# Patient Record
Sex: FEMALE | Race: WHITE | NOT HISPANIC OR LATINO | Employment: OTHER | URBAN - METROPOLITAN AREA
[De-identification: names, ages, dates, MRNs, and addresses within clinical notes are randomized per-mention and may not be internally consistent; named-entity substitution may affect disease eponyms.]

---

## 2023-05-16 ENCOUNTER — HOSPITAL ENCOUNTER (OUTPATIENT)
Dept: DATA CONVERSION | Facility: HOSPITAL | Age: 68
End: 2023-05-16
Attending: INTERNAL MEDICINE | Admitting: INTERNAL MEDICINE
Payer: MEDICARE

## 2023-05-16 DIAGNOSIS — Z86.010 PERSONAL HISTORY OF COLONIC POLYPS: ICD-10-CM

## 2023-05-16 DIAGNOSIS — D12.4 BENIGN NEOPLASM OF DESCENDING COLON: ICD-10-CM

## 2023-05-16 DIAGNOSIS — Z80.0 FAMILY HISTORY OF MALIGNANT NEOPLASM OF DIGESTIVE ORGANS: ICD-10-CM

## 2023-05-16 DIAGNOSIS — K57.30 DIVERTICULOSIS OF LARGE INTESTINE WITHOUT PERFORATION OR ABSCESS WITHOUT BLEEDING: ICD-10-CM

## 2023-05-16 DIAGNOSIS — F41.9 ANXIETY DISORDER, UNSPECIFIED: ICD-10-CM

## 2023-05-16 DIAGNOSIS — D12.2 BENIGN NEOPLASM OF ASCENDING COLON: ICD-10-CM

## 2023-05-16 DIAGNOSIS — K21.9 GASTRO-ESOPHAGEAL REFLUX DISEASE WITHOUT ESOPHAGITIS: ICD-10-CM

## 2023-05-16 DIAGNOSIS — Z12.11 ENCOUNTER FOR SCREENING FOR MALIGNANT NEOPLASM OF COLON: ICD-10-CM

## 2023-05-16 DIAGNOSIS — K64.0 FIRST DEGREE HEMORRHOIDS: ICD-10-CM

## 2023-05-16 DIAGNOSIS — D12.3 BENIGN NEOPLASM OF TRANSVERSE COLON: ICD-10-CM

## 2023-05-16 DIAGNOSIS — Z87.891 PERSONAL HISTORY OF NICOTINE DEPENDENCE: ICD-10-CM

## 2023-05-22 LAB
COMPLETE PATHOLOGY REPORT: NORMAL
CONVERTED CLINICAL DIAGNOSIS-HISTORY: NORMAL
CONVERTED FINAL DIAGNOSIS: NORMAL
CONVERTED FINAL REPORT PDF LINK TO COPY AND PASTE: NORMAL
CONVERTED GROSS DESCRIPTION: NORMAL

## 2023-07-20 LAB
ALANINE AMINOTRANSFERASE (SGPT) (U/L) IN SER/PLAS: 20 U/L (ref 7–45)
ALBUMIN (G/DL) IN SER/PLAS: 4.1 G/DL (ref 3.4–5)
ALKALINE PHOSPHATASE (U/L) IN SER/PLAS: 50 U/L (ref 33–136)
ANION GAP IN SER/PLAS: 12 MMOL/L (ref 10–20)
ASPARTATE AMINOTRANSFERASE (SGOT) (U/L) IN SER/PLAS: 26 U/L (ref 9–39)
BILIRUBIN TOTAL (MG/DL) IN SER/PLAS: 0.8 MG/DL (ref 0–1.2)
CALCIDIOL (25 OH VITAMIN D3) (NG/ML) IN SER/PLAS: 62 NG/ML
CALCIUM (MG/DL) IN SER/PLAS: 9.3 MG/DL (ref 8.6–10.3)
CARBON DIOXIDE, TOTAL (MMOL/L) IN SER/PLAS: 27 MMOL/L (ref 21–32)
CHLORIDE (MMOL/L) IN SER/PLAS: 98 MMOL/L (ref 98–107)
CREATININE (MG/DL) IN SER/PLAS: 0.75 MG/DL (ref 0.5–1.05)
ERYTHROCYTE DISTRIBUTION WIDTH (RATIO) BY AUTOMATED COUNT: 12.4 % (ref 11.5–14.5)
ERYTHROCYTE MEAN CORPUSCULAR HEMOGLOBIN CONCENTRATION (G/DL) BY AUTOMATED: 34.4 G/DL (ref 32–36)
ERYTHROCYTE MEAN CORPUSCULAR VOLUME (FL) BY AUTOMATED COUNT: 93 FL (ref 80–100)
ERYTHROCYTES (10*6/UL) IN BLOOD BY AUTOMATED COUNT: 4.38 X10E12/L (ref 4–5.2)
GFR FEMALE: 88 ML/MIN/1.73M2
GLUCOSE (MG/DL) IN SER/PLAS: 86 MG/DL (ref 74–99)
HEMATOCRIT (%) IN BLOOD BY AUTOMATED COUNT: 40.7 % (ref 36–46)
HEMOGLOBIN (G/DL) IN BLOOD: 14 G/DL (ref 12–16)
LEUKOCYTES (10*3/UL) IN BLOOD BY AUTOMATED COUNT: 4.1 X10E9/L (ref 4.4–11.3)
PLATELETS (10*3/UL) IN BLOOD AUTOMATED COUNT: 219 X10E9/L (ref 150–450)
POTASSIUM (MMOL/L) IN SER/PLAS: 3.7 MMOL/L (ref 3.5–5.3)
PROTEIN TOTAL: 6.8 G/DL (ref 6.4–8.2)
SODIUM (MMOL/L) IN SER/PLAS: 133 MMOL/L (ref 136–145)
THYROTROPIN (MIU/L) IN SER/PLAS BY DETECTION LIMIT <= 0.05 MIU/L: 2.73 MIU/L (ref 0.44–3.98)
UREA NITROGEN (MG/DL) IN SER/PLAS: 7 MG/DL (ref 6–23)

## 2023-09-07 VITALS — HEIGHT: 61 IN | WEIGHT: 137.79 LBS | BODY MASS INDEX: 26.01 KG/M2

## 2023-09-28 ENCOUNTER — TELEPHONE (OUTPATIENT)
Dept: PRIMARY CARE | Facility: CLINIC | Age: 68
End: 2023-09-28
Payer: MEDICARE

## 2023-09-28 NOTE — TELEPHONE ENCOUNTER
Patient last saw you a little over 2 years ago. She moved away so was with a different doctor Did make an appt to get back in with you. She is on alprazolam and wanted to know if you are willing to prescribe? She does not need it now, can wait until her appt

## 2023-12-18 ENCOUNTER — OFFICE VISIT (OUTPATIENT)
Dept: PRIMARY CARE | Facility: CLINIC | Age: 68
End: 2023-12-18
Payer: MEDICARE

## 2023-12-18 VITALS
BODY MASS INDEX: 25.58 KG/M2 | HEART RATE: 72 BPM | WEIGHT: 139 LBS | SYSTOLIC BLOOD PRESSURE: 112 MMHG | DIASTOLIC BLOOD PRESSURE: 68 MMHG | RESPIRATION RATE: 14 BRPM | HEIGHT: 62 IN

## 2023-12-18 DIAGNOSIS — J30.89 ALLERGIC RHINITIS DUE TO HOUSE DUST MITE: ICD-10-CM

## 2023-12-18 DIAGNOSIS — E66.3 OVERWEIGHT WITH BODY MASS INDEX (BMI) OF 25 TO 25.9 IN ADULT: ICD-10-CM

## 2023-12-18 DIAGNOSIS — K58.0 IRRITABLE BOWEL SYNDROME WITH DIARRHEA: ICD-10-CM

## 2023-12-18 DIAGNOSIS — F51.04 PSYCHOPHYSIOLOGICAL INSOMNIA: ICD-10-CM

## 2023-12-18 DIAGNOSIS — K21.00 GASTROESOPHAGEAL REFLUX DISEASE WITH ESOPHAGITIS WITHOUT HEMORRHAGE: ICD-10-CM

## 2023-12-18 DIAGNOSIS — L50.9 HIVES: ICD-10-CM

## 2023-12-18 DIAGNOSIS — D72.819 LEUKOPENIA, UNSPECIFIED TYPE: ICD-10-CM

## 2023-12-18 DIAGNOSIS — Z53.20 OSTEOPOROSIS MONITORING DECLINED: ICD-10-CM

## 2023-12-18 DIAGNOSIS — Z00.00 MEDICARE ANNUAL WELLNESS VISIT, INITIAL: Primary | ICD-10-CM

## 2023-12-18 DIAGNOSIS — J30.1 SEASONAL ALLERGIC RHINITIS DUE TO POLLEN: ICD-10-CM

## 2023-12-18 DIAGNOSIS — Z51.81 THERAPEUTIC DRUG MONITORING: ICD-10-CM

## 2023-12-18 PROBLEM — M25.561 RIGHT KNEE PAIN: Status: ACTIVE | Noted: 2023-12-18

## 2023-12-18 PROBLEM — R76.8 ELEVATED IGE LEVEL: Status: ACTIVE | Noted: 2023-12-18

## 2023-12-18 PROBLEM — E74.10 FRUCTOSE MALABSORPTION: Status: ACTIVE | Noted: 2023-12-18

## 2023-12-18 PROBLEM — G47.00 INSOMNIA: Status: ACTIVE | Noted: 2023-12-18

## 2023-12-18 PROBLEM — K63.5 COLON POLYP: Status: RESOLVED | Noted: 2023-12-18 | Resolved: 2023-12-18

## 2023-12-18 PROBLEM — J30.2 SEASONAL ALLERGIES: Status: ACTIVE | Noted: 2023-12-18

## 2023-12-18 PROBLEM — M22.41 CHONDROMALACIA OF RIGHT PATELLA: Status: ACTIVE | Noted: 2023-12-18

## 2023-12-18 PROBLEM — L23.1 ALLERGIC CONTACT DERMATITIS DUE TO ADHESIVES: Status: ACTIVE | Noted: 2021-07-29

## 2023-12-18 PROBLEM — K22.719 BARRETT'S ESOPHAGUS WITH DYSPLASIA: Status: ACTIVE | Noted: 2021-07-29

## 2023-12-18 PROBLEM — J31.0 CHRONIC RHINITIS: Status: ACTIVE | Noted: 2021-07-29

## 2023-12-18 PROBLEM — K63.5 COLON POLYP: Status: ACTIVE | Noted: 2023-12-18

## 2023-12-18 PROBLEM — Z23 ENCOUNTER FOR IMMUNIZATION: Status: ACTIVE | Noted: 2023-12-18

## 2023-12-18 PROBLEM — K90.49 FOOD INTOLERANCE IN ADULT: Status: ACTIVE | Noted: 2021-07-29

## 2023-12-18 PROBLEM — M25.561 RIGHT KNEE PAIN: Status: RESOLVED | Noted: 2023-12-18 | Resolved: 2023-12-18

## 2023-12-18 PROBLEM — K57.90 DIVERTICULOSIS: Status: ACTIVE | Noted: 2023-12-18

## 2023-12-18 PROBLEM — Z91.018 MULTIPLE FOOD ALLERGIES: Status: ACTIVE | Noted: 2021-07-29

## 2023-12-18 PROBLEM — H26.9 CATARACT: Status: ACTIVE | Noted: 2023-12-18

## 2023-12-18 PROBLEM — K59.00 CONSTIPATION: Status: RESOLVED | Noted: 2023-12-18 | Resolved: 2023-12-18

## 2023-12-18 PROBLEM — J31.0 CHRONIC RHINITIS: Status: RESOLVED | Noted: 2021-07-29 | Resolved: 2023-12-18

## 2023-12-18 PROBLEM — M81.0 OSTEOPOROSIS: Status: ACTIVE | Noted: 2023-12-18

## 2023-12-18 PROBLEM — K59.00 CONSTIPATION: Status: ACTIVE | Noted: 2023-12-18

## 2023-12-18 PROBLEM — M22.41 CHONDROMALACIA OF RIGHT PATELLA: Status: RESOLVED | Noted: 2023-12-18 | Resolved: 2023-12-18

## 2023-12-18 PROCEDURE — 1160F RVW MEDS BY RX/DR IN RCRD: CPT | Performed by: FAMILY MEDICINE

## 2023-12-18 PROCEDURE — 81003 URINALYSIS AUTO W/O SCOPE: CPT

## 2023-12-18 PROCEDURE — 80365 DRUG SCREENING OXYCODONE: CPT

## 2023-12-18 PROCEDURE — G0438 PPPS, INITIAL VISIT: HCPCS | Performed by: FAMILY MEDICINE

## 2023-12-18 PROCEDURE — 1036F TOBACCO NON-USER: CPT | Performed by: FAMILY MEDICINE

## 2023-12-18 PROCEDURE — 1170F FXNL STATUS ASSESSED: CPT | Performed by: FAMILY MEDICINE

## 2023-12-18 PROCEDURE — 80307 DRUG TEST PRSMV CHEM ANLYZR: CPT

## 2023-12-18 PROCEDURE — 80354 DRUG SCREENING FENTANYL: CPT

## 2023-12-18 PROCEDURE — 80373 DRUG SCREENING TRAMADOL: CPT

## 2023-12-18 PROCEDURE — 80361 OPIATES 1 OR MORE: CPT

## 2023-12-18 PROCEDURE — 99214 OFFICE O/P EST MOD 30 MIN: CPT | Performed by: FAMILY MEDICINE

## 2023-12-18 PROCEDURE — 1159F MED LIST DOCD IN RCRD: CPT | Performed by: FAMILY MEDICINE

## 2023-12-18 PROCEDURE — 82570 ASSAY OF URINE CREATININE: CPT

## 2023-12-18 PROCEDURE — 80358 DRUG SCREENING METHADONE: CPT

## 2023-12-18 PROCEDURE — 80346 BENZODIAZEPINES1-12: CPT

## 2023-12-18 PROCEDURE — 3008F BODY MASS INDEX DOCD: CPT | Performed by: FAMILY MEDICINE

## 2023-12-18 PROCEDURE — 80368 SEDATIVE HYPNOTICS: CPT

## 2023-12-18 RX ORDER — MINERAL OIL
180 ENEMA (ML) RECTAL
COMMUNITY
Start: 2021-07-29 | End: 2023-12-18 | Stop reason: SDUPTHER

## 2023-12-18 RX ORDER — LORAZEPAM 2 MG/1
2 TABLET ORAL NIGHTLY
COMMUNITY
Start: 2023-11-20 | End: 2023-12-18 | Stop reason: SDUPTHER

## 2023-12-18 RX ORDER — LANSOPRAZOLE 30 MG/1
CAPSULE, DELAYED RELEASE ORAL
COMMUNITY
Start: 2023-09-21 | End: 2023-12-18 | Stop reason: SDUPTHER

## 2023-12-18 RX ORDER — LANSOPRAZOLE 30 MG/1
30 CAPSULE, DELAYED RELEASE ORAL
Start: 2023-12-18 | End: 2024-02-12 | Stop reason: SDUPTHER

## 2023-12-18 RX ORDER — LORAZEPAM 2 MG/1
2 TABLET ORAL NIGHTLY
Qty: 30 TABLET | Refills: 0 | Status: SHIPPED | OUTPATIENT
Start: 2023-12-18 | End: 2024-01-22 | Stop reason: SDUPTHER

## 2023-12-18 RX ORDER — MINERAL OIL
180 ENEMA (ML) RECTAL
Start: 2023-12-18

## 2023-12-18 RX ORDER — MULTIVITAMIN
1 TABLET ORAL DAILY
COMMUNITY

## 2023-12-18 ASSESSMENT — PATIENT HEALTH QUESTIONNAIRE - PHQ9
1. LITTLE INTEREST OR PLEASURE IN DOING THINGS: SEVERAL DAYS
2. FEELING DOWN, DEPRESSED OR HOPELESS: SEVERAL DAYS
SUM OF ALL RESPONSES TO PHQ9 QUESTIONS 1 AND 2: 2

## 2023-12-18 ASSESSMENT — ENCOUNTER SYMPTOMS
NUMBNESS: 0
CONSTIPATION: 0
DYSPHORIC MOOD: 0
WHEEZING: 0
DIAPHORESIS: 0
CONFUSION: 0
COUGH: 0
POLYPHAGIA: 0
CHEST TIGHTNESS: 0
SINUS PAIN: 0
FEVER: 0
FREQUENCY: 0
SINUS PRESSURE: 0
SORE THROAT: 0
UNEXPECTED WEIGHT CHANGE: 0
VOMITING: 0
ADENOPATHY: 0
CHILLS: 0
ABDOMINAL PAIN: 0
DYSURIA: 0
NERVOUS/ANXIOUS: 0
NAUSEA: 0
DIZZINESS: 0
DIARRHEA: 0
POLYDIPSIA: 0
PALPITATIONS: 0
HEMATURIA: 0
SHORTNESS OF BREATH: 0
LIGHT-HEADEDNESS: 0
HEADACHES: 0

## 2023-12-18 ASSESSMENT — ANXIETY QUESTIONNAIRES
IF YOU CHECKED OFF ANY PROBLEMS ON THIS QUESTIONNAIRE, HOW DIFFICULT HAVE THESE PROBLEMS MADE IT FOR YOU TO DO YOUR WORK, TAKE CARE OF THINGS AT HOME, OR GET ALONG WITH OTHER PEOPLE: NOT DIFFICULT AT ALL
3. WORRYING TOO MUCH ABOUT DIFFERENT THINGS: SEVERAL DAYS
5. BEING SO RESTLESS THAT IT IS HARD TO SIT STILL: NOT AT ALL
1. FEELING NERVOUS, ANXIOUS, OR ON EDGE: NOT AT ALL
6. BECOMING EASILY ANNOYED OR IRRITABLE: NOT AT ALL
4. TROUBLE RELAXING: NOT AT ALL
7. FEELING AFRAID AS IF SOMETHING AWFUL MIGHT HAPPEN: NOT AT ALL
2. NOT BEING ABLE TO STOP OR CONTROL WORRYING: NOT AT ALL
GAD7 TOTAL SCORE: 1

## 2023-12-18 ASSESSMENT — ACTIVITIES OF DAILY LIVING (ADL)
MANAGING_FINANCES: INDEPENDENT
BATHING: INDEPENDENT
DRESSING: INDEPENDENT
GROCERY_SHOPPING: INDEPENDENT
DOING_HOUSEWORK: INDEPENDENT
TAKING_MEDICATION: INDEPENDENT

## 2023-12-18 NOTE — PROGRESS NOTES
Subjective   Reason for Visit: Paty Guthrie is an 68 y.o. female here for a Medicare Wellness visit.     Past Medical, Surgical, and Family History reviewed and updated in chart.    Reviewed all medications by prescribing practitioner or clinical pharmacist (such as prescriptions, OTCs, herbal therapies and supplements) and documented in the medical record.    HPI    routine follow up. chronic issues as per assessment and plan.     OARRS:  Dagmar Robin MD on 2023  2:33 PM  I have personally reviewed the OARRS report for Paty Guthrie. I have considered the risks of abuse, dependence, addiction and diversion and I believe that it is clinically appropriate for Paty Guthrie to be prescribed this medication    Is the patient prescribed a combination of a benzodiazepine and opioid?  No    Last Urine Drug Screen / ordered today: Yes  No results found for this or any previous visit (from the past 8760 hour(s)).  N/A        Controlled Substance Agreement: Benzodiazepine  Date of the Last Agreement: 23   Reviewed Controlled Substance Agreement including but not limited to the benefits, risks, and alternatives to treatment with a Controlled Substance medication(s).    Benzodiazepines:  What is the patient's goal of therapy? Initiation and maintenance of sleep  Is this being achieved with current treatment? yes    OSVALDO-7:  Over the last 2 weeks, how often have you been bothered by any of the following problems?  Feeling nervous, anxious, or on edge: 0  Not being able to stop or control worryin  Worrying too much about different things: 1  Trouble relaxin  Being so restless that it is hard to sit still: 0  Becoming easily annoyed or irritable: 0  Feeling afraid as if something awful might happen: 0  OSVALDO-7 Total Score: 1        Activities of Daily Living:   Is your overall impression that this patient is benefiting (symptom reduction outweighs side effects) from benzodiazepine therapy? Yes     1.  "Physical Functioning: Better  2. Family Relationship: Better  3. Social Relationship: Better  4. Mood: Better  5. Sleep Patterns: Better  6. Overall Function: Better    Patient Care Team:  Dagmar Robin MD as PCP - General (Family Medicine)     Review of Systems   Constitutional:  Negative for chills, diaphoresis, fever and unexpected weight change.   HENT:  Negative for congestion, sinus pressure, sinus pain, sneezing and sore throat.    Respiratory:  Negative for cough, chest tightness, shortness of breath and wheezing.    Cardiovascular:  Negative for chest pain, palpitations and leg swelling.   Gastrointestinal:  Negative for abdominal pain, constipation, diarrhea, nausea and vomiting.   Endocrine: Negative for cold intolerance, heat intolerance, polydipsia, polyphagia and polyuria.   Genitourinary:  Negative for dysuria, frequency, hematuria and urgency.   Neurological:  Negative for dizziness, syncope, light-headedness, numbness and headaches.   Hematological:  Negative for adenopathy.   Psychiatric/Behavioral:  Negative for confusion and dysphoric mood. The patient is not nervous/anxious.        Objective   Vitals:  /68   Pulse 72   Resp 14   Ht 1.562 m (5' 1.5\")   Wt 63 kg (139 lb)   BMI 25.84 kg/m²       Physical Exam  Vitals and nursing note reviewed.   Constitutional:       General: She is not in acute distress.     Appearance: Normal appearance.   HENT:      Head: Normocephalic and atraumatic.      Nose: Nose normal.   Eyes:      Extraocular Movements: Extraocular movements intact.      Conjunctiva/sclera: Conjunctivae normal.      Pupils: Pupils are equal, round, and reactive to light.   Cardiovascular:      Rate and Rhythm: Normal rate and regular rhythm.      Heart sounds: No murmur heard.     No friction rub. No gallop.   Pulmonary:      Effort: Pulmonary effort is normal.      Breath sounds: Normal breath sounds. No wheezing, rhonchi or rales.   Abdominal:      General: Bowel sounds are " normal. There is no distension.      Palpations: Abdomen is soft.      Tenderness: There is no abdominal tenderness.   Musculoskeletal:         General: Normal range of motion.      Cervical back: Normal range of motion and neck supple.   Skin:     General: Skin is warm and dry.   Neurological:      General: No focal deficit present.      Mental Status: She is alert and oriented to person, place, and time.      Deep Tendon Reflexes: Reflexes normal.   Psychiatric:         Mood and Affect: Mood normal.         Behavior: Behavior normal.         Thought Content: Thought content normal.         Judgment: Judgment normal.         Assessment/Plan   Problem List Items Addressed This Visit       Allergic rhinitis due to house dust mite    Current Assessment & Plan     - continue fexofenadine          Relevant Medications    fexofenadine (Allegra) 180 mg tablet    BMI 25.0-25.9,adult    Current Assessment & Plan     - Encouraged healthy lifestyle, including adequate exercise and high fiber, low fat and low carb diet.          Gastroesophageal reflux disease with esophagitis without hemorrhage    Current Assessment & Plan     - controlled. Continue lansoprazole          Relevant Medications    lansoprazole (Prevacid) 30 mg DR capsule    Hives    Current Assessment & Plan     - continue fexofenadine         Relevant Medications    fexofenadine (Allegra) 180 mg tablet    Irritable bowel syndrome with diarrhea    Current Assessment & Plan     - diet controlled.         Leukopenia    Current Assessment & Plan     - saw hematology. [place] to be benign. monitor         Osteoporosis monitoring declined    Current Assessment & Plan     - states she does want to have a DEXA scan but right now is not the time. She will let me know when she is ready to schedule          Overweight with body mass index (BMI) of 25 to 25.9 in adult    Current Assessment & Plan     - Encouraged healthy lifestyle, including adequate exercise and high fiber,  low fat and low carb diet.          Psychophysiological insomnia    Current Assessment & Plan     - stable. continue lorazepam. Has tried many other options and has not been successful         Relevant Medications    LORazepam (Ativan) 2 mg tablet    Seasonal allergic rhinitis due to pollen    Current Assessment & Plan     - controlled. Continue fexofenadine          Relevant Medications    fexofenadine (Allegra) 180 mg tablet     Other Visit Diagnoses       Medicare annual wellness visit, initial    -  Primary    Therapeutic drug monitoring        Relevant Orders    Opiate/Opioid/Benzo Prescription Compliance

## 2023-12-18 NOTE — ASSESSMENT & PLAN NOTE
- states she does want to have a DEXA scan but right now is not the time. She will let me know when she is ready to schedule

## 2023-12-18 NOTE — PATIENT INSTRUCTIONS
Paty Guthrie ,    Thank you for coming in today. We at [facility] appreciate your trust in our care. If you have any questions or concerns about the care you received today, please do not hesitate to contact us at [phone].    The following instructions were discussed today:    - I recommend the following vaccines at the pharmacy: COVID-19, Shingrix, RSV  - Follow up in 3 months.

## 2023-12-18 NOTE — ASSESSMENT & PLAN NOTE
- declines flu shot  - declines PCV 20  - recommended the following vaccines at the pharmacy: COVID-19, Shingrix, RSV

## 2023-12-19 LAB
AMPHETAMINES UR QL SCN: ABNORMAL
BARBITURATES UR QL SCN: ABNORMAL
BZE UR QL SCN: ABNORMAL
CANNABINOIDS UR QL SCN: ABNORMAL
CREAT UR-MCNC: 13.1 MG/DL (ref 20–320)
PCP UR QL SCN: ABNORMAL
SP GR UR STRIP.AUTO: 1

## 2023-12-21 LAB
1OH-MIDAZOLAM UR CFM-MCNC: <25 NG/ML
6MAM UR CFM-MCNC: <25 NG/ML
7AMINOCLONAZEPAM UR CFM-MCNC: <25 NG/ML
A-OH ALPRAZ UR CFM-MCNC: <25 NG/ML
ALPRAZ UR CFM-MCNC: <25 NG/ML
CHLORDIAZEP UR CFM-MCNC: <25 NG/ML
CLONAZEPAM UR CFM-MCNC: <25 NG/ML
CODEINE UR CFM-MCNC: <50 NG/ML
DIAZEPAM UR CFM-MCNC: <25 NG/ML
EDDP UR CFM-MCNC: <25 NG/ML
FENTANYL UR CFM-MCNC: <2.5 NG/ML
HYDROCODONE CTO UR CFM-MCNC: <25 NG/ML
HYDROMORPHONE UR CFM-MCNC: <25 NG/ML
LORAZEPAM UR CFM-MCNC: 228 NG/ML
METHADONE UR CFM-MCNC: <25 NG/ML
MIDAZOLAM UR CFM-MCNC: <25 NG/ML
MORPHINE UR CFM-MCNC: <50 NG/ML
NORDIAZEPAM UR CFM-MCNC: <25 NG/ML
NORFENTANYL UR CFM-MCNC: <2.5 NG/ML
NORHYDROCODONE UR CFM-MCNC: <25 NG/ML
NOROXYCODONE UR CFM-MCNC: <25 NG/ML
NORTRAMADOL UR-MCNC: <50 NG/ML
OXAZEPAM UR CFM-MCNC: <25 NG/ML
OXYCODONE UR CFM-MCNC: <25 NG/ML
OXYMORPHONE UR CFM-MCNC: <25 NG/ML
TEMAZEPAM UR CFM-MCNC: <25 NG/ML
TRAMADOL UR CFM-MCNC: <50 NG/ML
ZOLPIDEM UR CFM-MCNC: <25 NG/ML
ZOLPIDEM UR-MCNC: <25 NG/ML

## 2024-01-22 DIAGNOSIS — F51.04 PSYCHOPHYSIOLOGICAL INSOMNIA: ICD-10-CM

## 2024-01-22 RX ORDER — LORAZEPAM 2 MG/1
2 TABLET ORAL NIGHTLY
Qty: 30 TABLET | Refills: 0 | Status: SHIPPED | OUTPATIENT
Start: 2024-01-22 | End: 2024-02-21 | Stop reason: SDUPTHER

## 2024-01-22 NOTE — TELEPHONE ENCOUNTER
Rx Refill Request Telephone Encounter    Name:  Paty Guthrie  :  [id number]  Medication Name:  lorazapam            Specific Pharmacy location:  [facility]  Date of last appointment:  23  Date of next appointment:  24  Best number to reach patient:  [phone]

## 2024-01-22 NOTE — TELEPHONE ENCOUNTER
Last appointment: 12/18/2023  Next appointment: 3/26/2024     Eprescribed. I have personally reviewed the OARRS report.  This report is scanned into the electronic medical record.  I have considered the risks of abuse, dependence, addiction and diversion.  I believe that it is clinically appropriate to prescribe this medication.

## 2024-02-12 DIAGNOSIS — K21.00 GASTROESOPHAGEAL REFLUX DISEASE WITH ESOPHAGITIS WITHOUT HEMORRHAGE: ICD-10-CM

## 2024-02-13 RX ORDER — LANSOPRAZOLE 30 MG/1
30 CAPSULE, DELAYED RELEASE ORAL
Qty: 90 CAPSULE | Refills: 1 | Status: SHIPPED | OUTPATIENT
Start: 2024-02-13 | End: 2024-08-11

## 2024-02-15 ENCOUNTER — TELEPHONE (OUTPATIENT)
Dept: PRIMARY CARE | Facility: CLINIC | Age: 69
End: 2024-02-15
Payer: MEDICARE

## 2024-02-15 NOTE — LETTER
Paty Guthrie  1955    2/15/2024    To Whom This May Concern:    My patient, Paty Guthrie, is unable to perform Jury Duty due to a mental or physical condition that renders the prospective juror unfit for jury service for the remainder of the jury year.    Should you have any questions please do not hesitate to call.    Thank you for your cooperation.    Sincerely,    Dagmar Robin MD

## 2024-02-15 NOTE — TELEPHONE ENCOUNTER
please let patient know  I wrote a letter and sent it to her MyChart so she should be able to print it off from them. If she cannot, please print a copy for her.    yes...

## 2024-02-15 NOTE — TELEPHONE ENCOUNTER
Pt was summoned for jury duty in [facility] and she wrote letter requesting to be excused but was told she needed letter from pcp.  She said she has IBS and would not be able to do jury duty.  She wants letter faxed to [phone].    Faxed and called pt to let her know it was done and copy in her MyChart.

## 2024-02-21 DIAGNOSIS — F51.04 PSYCHOPHYSIOLOGICAL INSOMNIA: ICD-10-CM

## 2024-02-21 RX ORDER — LORAZEPAM 2 MG/1
2 TABLET ORAL NIGHTLY
Qty: 30 TABLET | Refills: 0 | Status: SHIPPED | OUTPATIENT
Start: 2024-02-21 | End: 2024-03-28 | Stop reason: SDUPTHER

## 2024-02-21 NOTE — TELEPHONE ENCOUNTER
Last appointment: 2/15/2024  Next appointment: 3/26/2024     Eprescribed. I have personally reviewed the OARRS report.  This report is scanned into the electronic medical record.  I have considered the risks of abuse, dependence, addiction and diversion.  I believe that it is clinically appropriate to prescribe this medication.

## 2024-03-25 DIAGNOSIS — F51.04 PSYCHOPHYSIOLOGICAL INSOMNIA: ICD-10-CM

## 2024-03-26 ENCOUNTER — APPOINTMENT (OUTPATIENT)
Dept: PRIMARY CARE | Facility: CLINIC | Age: 69
End: 2024-03-26
Payer: MEDICARE

## 2024-03-27 RX ORDER — LORAZEPAM 2 MG/1
2 TABLET ORAL NIGHTLY
Qty: 30 TABLET | Refills: 0 | OUTPATIENT
Start: 2024-03-27 | End: 2024-04-26

## 2024-03-28 ENCOUNTER — OFFICE VISIT (OUTPATIENT)
Dept: PRIMARY CARE | Facility: CLINIC | Age: 69
End: 2024-03-28
Payer: MEDICARE

## 2024-03-28 VITALS
HEART RATE: 63 BPM | HEIGHT: 62 IN | DIASTOLIC BLOOD PRESSURE: 84 MMHG | WEIGHT: 142 LBS | OXYGEN SATURATION: 94 % | RESPIRATION RATE: 14 BRPM | BODY MASS INDEX: 26.13 KG/M2 | SYSTOLIC BLOOD PRESSURE: 129 MMHG

## 2024-03-28 DIAGNOSIS — R68.89 OTHER GENERAL SYMPTOMS AND SIGNS: ICD-10-CM

## 2024-03-28 DIAGNOSIS — E66.3 OVERWEIGHT WITH BODY MASS INDEX (BMI) OF 26 TO 26.9 IN ADULT: ICD-10-CM

## 2024-03-28 DIAGNOSIS — K22.719 BARRETT'S ESOPHAGUS WITH DYSPLASIA: ICD-10-CM

## 2024-03-28 DIAGNOSIS — Z11.59 NEED FOR HEPATITIS C SCREENING TEST: ICD-10-CM

## 2024-03-28 DIAGNOSIS — M81.0 AGE-RELATED OSTEOPOROSIS WITHOUT CURRENT PATHOLOGICAL FRACTURE: ICD-10-CM

## 2024-03-28 DIAGNOSIS — K58.0 IRRITABLE BOWEL SYNDROME WITH DIARRHEA: ICD-10-CM

## 2024-03-28 DIAGNOSIS — L50.9 HIVES: ICD-10-CM

## 2024-03-28 DIAGNOSIS — Z13.220 LIPID SCREENING: ICD-10-CM

## 2024-03-28 DIAGNOSIS — J30.89 ALLERGIC RHINITIS DUE TO HOUSE DUST MITE: Primary | ICD-10-CM

## 2024-03-28 DIAGNOSIS — D72.819 LEUKOPENIA, UNSPECIFIED TYPE: ICD-10-CM

## 2024-03-28 DIAGNOSIS — K21.00 GASTROESOPHAGEAL REFLUX DISEASE WITH ESOPHAGITIS WITHOUT HEMORRHAGE: ICD-10-CM

## 2024-03-28 DIAGNOSIS — F51.04 PSYCHOPHYSIOLOGICAL INSOMNIA: ICD-10-CM

## 2024-03-28 PROCEDURE — 1159F MED LIST DOCD IN RCRD: CPT | Performed by: FAMILY MEDICINE

## 2024-03-28 PROCEDURE — 1160F RVW MEDS BY RX/DR IN RCRD: CPT | Performed by: FAMILY MEDICINE

## 2024-03-28 PROCEDURE — 1036F TOBACCO NON-USER: CPT | Performed by: FAMILY MEDICINE

## 2024-03-28 PROCEDURE — 99214 OFFICE O/P EST MOD 30 MIN: CPT | Performed by: FAMILY MEDICINE

## 2024-03-28 PROCEDURE — 3008F BODY MASS INDEX DOCD: CPT | Performed by: FAMILY MEDICINE

## 2024-03-28 RX ORDER — LORAZEPAM 2 MG/1
2 TABLET ORAL NIGHTLY
Qty: 30 TABLET | Refills: 0 | Status: SHIPPED | OUTPATIENT
Start: 2024-03-28 | End: 2024-04-22 | Stop reason: SDUPTHER

## 2024-03-28 ASSESSMENT — ANXIETY QUESTIONNAIRES
GAD7 TOTAL SCORE: 1
4. TROUBLE RELAXING: NOT AT ALL
5. BEING SO RESTLESS THAT IT IS HARD TO SIT STILL: NOT AT ALL
1. FEELING NERVOUS, ANXIOUS, OR ON EDGE: NOT AT ALL
3. WORRYING TOO MUCH ABOUT DIFFERENT THINGS: NOT AT ALL
IF YOU CHECKED OFF ANY PROBLEMS ON THIS QUESTIONNAIRE, HOW DIFFICULT HAVE THESE PROBLEMS MADE IT FOR YOU TO DO YOUR WORK, TAKE CARE OF THINGS AT HOME, OR GET ALONG WITH OTHER PEOPLE: NOT DIFFICULT AT ALL
2. NOT BEING ABLE TO STOP OR CONTROL WORRYING: NOT AT ALL
6. BECOMING EASILY ANNOYED OR IRRITABLE: NOT AT ALL
7. FEELING AFRAID AS IF SOMETHING AWFUL MIGHT HAPPEN: SEVERAL DAYS

## 2024-03-28 ASSESSMENT — ENCOUNTER SYMPTOMS
NERVOUS/ANXIOUS: 0
DIZZINESS: 0
SORE THROAT: 0
DIAPHORESIS: 0
UNEXPECTED WEIGHT CHANGE: 0
NAUSEA: 0
DYSURIA: 0
DYSPHORIC MOOD: 0
HEMATURIA: 0
LIGHT-HEADEDNESS: 0
FREQUENCY: 0
SHORTNESS OF BREATH: 0
PALPITATIONS: 0
VOMITING: 0
SINUS PRESSURE: 0
CHEST TIGHTNESS: 0
POLYPHAGIA: 0
CHILLS: 0
CONSTIPATION: 0
ABDOMINAL PAIN: 0
COUGH: 0
SINUS PAIN: 0
DIARRHEA: 0
ADENOPATHY: 0
WHEEZING: 0
HEADACHES: 0
NUMBNESS: 0
CONFUSION: 0
FEVER: 0
POLYDIPSIA: 0

## 2024-03-28 ASSESSMENT — PATIENT HEALTH QUESTIONNAIRE - PHQ9
2. FEELING DOWN, DEPRESSED OR HOPELESS: NOT AT ALL
1. LITTLE INTEREST OR PLEASURE IN DOING THINGS: NOT AT ALL
SUM OF ALL RESPONSES TO PHQ9 QUESTIONS 1 AND 2: 0

## 2024-03-28 NOTE — PATIENT INSTRUCTIONS
Paty Guthrie ,    Thank you for coming in today. We at [facility] appreciate your trust in our care. If you have any questions or concerns about the care you received today, please do not hesitate to contact us at [phone].    The following instructions were discussed today:    - get bone density (DEXA) scan  - Follow up in 3 months.    - Please get blood work done 1-2 weeks prior to your next visit. For blood work: Nothing to eat or drink for at least 10 hours prior. Okay for water or black coffee.

## 2024-04-22 DIAGNOSIS — F51.04 PSYCHOPHYSIOLOGICAL INSOMNIA: ICD-10-CM

## 2024-04-23 RX ORDER — LORAZEPAM 2 MG/1
2 TABLET ORAL NIGHTLY
Qty: 30 TABLET | Refills: 0 | Status: SHIPPED | OUTPATIENT
Start: 2024-04-23 | End: 2024-05-23

## 2024-04-23 NOTE — TELEPHONE ENCOUNTER
Last appointment: 3/28/2024  Next appointment: 7/3/2024     Eprescribed. I have personally reviewed the OARRS report.  This report is scanned into the electronic medical record.  I have considered the risks of abuse, dependence, addiction and diversion.  I believe that it is clinically appropriate to prescribe this medication.

## 2024-05-09 ENCOUNTER — APPOINTMENT (OUTPATIENT)
Dept: PRIMARY CARE | Facility: CLINIC | Age: 69
End: 2024-05-09
Payer: MEDICARE

## 2024-05-16 ENCOUNTER — LAB (OUTPATIENT)
Dept: LAB | Facility: LAB | Age: 69
End: 2024-05-16
Payer: MEDICARE

## 2024-05-16 DIAGNOSIS — J30.89 ALLERGIC RHINITIS DUE TO HOUSE DUST MITE: ICD-10-CM

## 2024-05-16 DIAGNOSIS — K22.719 BARRETT'S ESOPHAGUS WITH DYSPLASIA: ICD-10-CM

## 2024-05-16 DIAGNOSIS — R68.89 OTHER GENERAL SYMPTOMS AND SIGNS: ICD-10-CM

## 2024-05-16 DIAGNOSIS — D72.819 LEUKOPENIA, UNSPECIFIED TYPE: ICD-10-CM

## 2024-05-16 DIAGNOSIS — Z11.59 NEED FOR HEPATITIS C SCREENING TEST: ICD-10-CM

## 2024-05-16 DIAGNOSIS — K21.00 GASTROESOPHAGEAL REFLUX DISEASE WITH ESOPHAGITIS WITHOUT HEMORRHAGE: ICD-10-CM

## 2024-05-16 DIAGNOSIS — F51.04 PSYCHOPHYSIOLOGICAL INSOMNIA: ICD-10-CM

## 2024-05-16 DIAGNOSIS — K58.0 IRRITABLE BOWEL SYNDROME WITH DIARRHEA: ICD-10-CM

## 2024-05-16 DIAGNOSIS — L50.9 HIVES: ICD-10-CM

## 2024-05-16 DIAGNOSIS — M81.0 AGE-RELATED OSTEOPOROSIS WITHOUT CURRENT PATHOLOGICAL FRACTURE: ICD-10-CM

## 2024-05-16 DIAGNOSIS — Z13.220 LIPID SCREENING: ICD-10-CM

## 2024-05-16 DIAGNOSIS — E66.3 OVERWEIGHT WITH BODY MASS INDEX (BMI) OF 26 TO 26.9 IN ADULT: ICD-10-CM

## 2024-05-16 LAB
25(OH)D3 SERPL-MCNC: 72 NG/ML (ref 30–100)
ALBUMIN SERPL BCP-MCNC: 4.1 G/DL (ref 3.4–5)
ALP SERPL-CCNC: 43 U/L (ref 33–136)
ALT SERPL W P-5'-P-CCNC: 16 U/L (ref 7–45)
ANION GAP SERPL CALC-SCNC: 11 MMOL/L (ref 10–20)
AST SERPL W P-5'-P-CCNC: 22 U/L (ref 9–39)
BASOPHILS # BLD AUTO: 0.07 X10*3/UL (ref 0–0.1)
BASOPHILS NFR BLD AUTO: 1.6 %
BILIRUB SERPL-MCNC: 0.7 MG/DL (ref 0–1.2)
BUN SERPL-MCNC: 6 MG/DL (ref 6–23)
CALCIUM SERPL-MCNC: 9.3 MG/DL (ref 8.6–10.3)
CHLORIDE SERPL-SCNC: 101 MMOL/L (ref 98–107)
CHOLEST SERPL-MCNC: 191 MG/DL (ref 0–199)
CHOLESTEROL/HDL RATIO: 2.4
CO2 SERPL-SCNC: 28 MMOL/L (ref 21–32)
CREAT SERPL-MCNC: 0.78 MG/DL (ref 0.5–1.05)
EGFRCR SERPLBLD CKD-EPI 2021: 84 ML/MIN/1.73M*2
EOSINOPHIL # BLD AUTO: 0.31 X10*3/UL (ref 0–0.7)
EOSINOPHIL NFR BLD AUTO: 7.2 %
ERYTHROCYTE [DISTWIDTH] IN BLOOD BY AUTOMATED COUNT: 12.4 % (ref 11.5–14.5)
GLUCOSE SERPL-MCNC: 85 MG/DL (ref 74–99)
HCT VFR BLD AUTO: 45.1 % (ref 36–46)
HCV AB SER QL: NONREACTIVE
HDLC SERPL-MCNC: 78.4 MG/DL
HGB BLD-MCNC: 15.3 G/DL (ref 12–16)
IMM GRANULOCYTES # BLD AUTO: 0.01 X10*3/UL (ref 0–0.7)
IMM GRANULOCYTES NFR BLD AUTO: 0.2 % (ref 0–0.9)
LDLC SERPL CALC-MCNC: 97 MG/DL
LYMPHOCYTES # BLD AUTO: 1.37 X10*3/UL (ref 1.2–4.8)
LYMPHOCYTES NFR BLD AUTO: 31.7 %
MCH RBC QN AUTO: 32.3 PG (ref 26–34)
MCHC RBC AUTO-ENTMCNC: 33.9 G/DL (ref 32–36)
MCV RBC AUTO: 95 FL (ref 80–100)
MONOCYTES # BLD AUTO: 0.32 X10*3/UL (ref 0.1–1)
MONOCYTES NFR BLD AUTO: 7.4 %
NEUTROPHILS # BLD AUTO: 2.24 X10*3/UL (ref 1.2–7.7)
NEUTROPHILS NFR BLD AUTO: 51.9 %
NON HDL CHOLESTEROL: 113 MG/DL (ref 0–149)
NRBC BLD-RTO: 0 /100 WBCS (ref 0–0)
PLATELET # BLD AUTO: 233 X10*3/UL (ref 150–450)
POTASSIUM SERPL-SCNC: 4.1 MMOL/L (ref 3.5–5.3)
PROT SERPL-MCNC: 6.6 G/DL (ref 6.4–8.2)
RBC # BLD AUTO: 4.73 X10*6/UL (ref 4–5.2)
SODIUM SERPL-SCNC: 136 MMOL/L (ref 136–145)
TRIGL SERPL-MCNC: 80 MG/DL (ref 0–149)
TSH SERPL-ACNC: 1.95 MIU/L (ref 0.44–3.98)
VIT B12 SERPL-MCNC: 508 PG/ML (ref 211–911)
VLDL: 16 MG/DL (ref 0–40)
WBC # BLD AUTO: 4.3 X10*3/UL (ref 4.4–11.3)

## 2024-05-16 PROCEDURE — 85025 COMPLETE CBC W/AUTO DIFF WBC: CPT

## 2024-05-16 PROCEDURE — 80061 LIPID PANEL: CPT

## 2024-05-16 PROCEDURE — 84443 ASSAY THYROID STIM HORMONE: CPT

## 2024-05-16 PROCEDURE — 36415 COLL VENOUS BLD VENIPUNCTURE: CPT

## 2024-05-16 PROCEDURE — 86803 HEPATITIS C AB TEST: CPT

## 2024-05-16 PROCEDURE — 82306 VITAMIN D 25 HYDROXY: CPT

## 2024-05-16 PROCEDURE — 80053 COMPREHEN METABOLIC PANEL: CPT

## 2024-05-16 PROCEDURE — 82607 VITAMIN B-12: CPT

## 2024-05-20 DIAGNOSIS — F51.04 PSYCHOPHYSIOLOGICAL INSOMNIA: ICD-10-CM

## 2024-05-20 RX ORDER — LORAZEPAM 2 MG/1
2 TABLET ORAL NIGHTLY
Qty: 30 TABLET | Refills: 0 | OUTPATIENT
Start: 2024-05-20 | End: 2024-06-19

## 2024-05-23 ENCOUNTER — HOSPITAL ENCOUNTER (OUTPATIENT)
Dept: RADIOLOGY | Facility: CLINIC | Age: 69
Discharge: HOME | End: 2024-05-23
Payer: MEDICARE

## 2024-05-23 DIAGNOSIS — M81.0 AGE-RELATED OSTEOPOROSIS WITHOUT CURRENT PATHOLOGICAL FRACTURE: ICD-10-CM

## 2024-05-23 PROCEDURE — 77080 DXA BONE DENSITY AXIAL: CPT

## 2024-05-23 PROCEDURE — 77080 DXA BONE DENSITY AXIAL: CPT | Performed by: RADIOLOGY

## 2024-06-16 ENCOUNTER — TELEPHONE (OUTPATIENT)
Dept: PRIMARY CARE | Facility: CLINIC | Age: 69
End: 2024-06-16
Payer: MEDICARE

## 2024-06-16 NOTE — TELEPHONE ENCOUNTER
Please let patient know DEXA showed osteopenia. This means her bones are thin, but not thin enough to be called osteoporosis. I recommend she started calcium 500 to 600 mg plus vitamin D 200 IU. This comes in a combination pill that she should take twice daily. I also recommend weight bearing exercise.

## 2024-06-18 RX ORDER — ACETAMINOPHEN 500 MG
1 TABLET ORAL 2 TIMES DAILY
COMMUNITY

## 2024-07-03 ENCOUNTER — APPOINTMENT (OUTPATIENT)
Dept: PRIMARY CARE | Facility: CLINIC | Age: 69
End: 2024-07-03
Payer: MEDICARE

## 2024-07-03 VITALS
WEIGHT: 138 LBS | BODY MASS INDEX: 25.65 KG/M2 | OXYGEN SATURATION: 95 % | SYSTOLIC BLOOD PRESSURE: 122 MMHG | HEART RATE: 88 BPM | DIASTOLIC BLOOD PRESSURE: 64 MMHG

## 2024-07-03 DIAGNOSIS — Z23 ENCOUNTER FOR IMMUNIZATION: ICD-10-CM

## 2024-07-03 DIAGNOSIS — E66.3 OVERWEIGHT WITH BODY MASS INDEX (BMI) OF 25 TO 25.9 IN ADULT: ICD-10-CM

## 2024-07-03 DIAGNOSIS — K58.0 IRRITABLE BOWEL SYNDROME WITH DIARRHEA: ICD-10-CM

## 2024-07-03 DIAGNOSIS — F51.04 PSYCHOPHYSIOLOGICAL INSOMNIA: ICD-10-CM

## 2024-07-03 DIAGNOSIS — M85.89 OSTEOPENIA OF MULTIPLE SITES: ICD-10-CM

## 2024-07-03 DIAGNOSIS — D72.819 LEUKOPENIA, UNSPECIFIED TYPE: ICD-10-CM

## 2024-07-03 DIAGNOSIS — J30.89 ALLERGIC RHINITIS DUE TO HOUSE DUST MITE: ICD-10-CM

## 2024-07-03 DIAGNOSIS — L50.9 HIVES: ICD-10-CM

## 2024-07-03 DIAGNOSIS — J30.1 SEASONAL ALLERGIC RHINITIS DUE TO POLLEN: ICD-10-CM

## 2024-07-03 DIAGNOSIS — K21.00 GASTROESOPHAGEAL REFLUX DISEASE WITH ESOPHAGITIS WITHOUT HEMORRHAGE: Primary | ICD-10-CM

## 2024-07-03 DIAGNOSIS — K22.719 BARRETT'S ESOPHAGUS WITH DYSPLASIA: ICD-10-CM

## 2024-07-03 PROBLEM — L23.1 ALLERGIC CONTACT DERMATITIS DUE TO ADHESIVES: Status: RESOLVED | Noted: 2021-07-29 | Resolved: 2024-07-03

## 2024-07-03 PROBLEM — Z53.20: Status: RESOLVED | Noted: 2023-12-18 | Resolved: 2024-07-03

## 2024-07-03 PROCEDURE — 1036F TOBACCO NON-USER: CPT | Performed by: FAMILY MEDICINE

## 2024-07-03 PROCEDURE — 99214 OFFICE O/P EST MOD 30 MIN: CPT | Performed by: FAMILY MEDICINE

## 2024-07-03 PROCEDURE — 1160F RVW MEDS BY RX/DR IN RCRD: CPT | Performed by: FAMILY MEDICINE

## 2024-07-03 PROCEDURE — 1159F MED LIST DOCD IN RCRD: CPT | Performed by: FAMILY MEDICINE

## 2024-07-03 PROCEDURE — 3008F BODY MASS INDEX DOCD: CPT | Performed by: FAMILY MEDICINE

## 2024-07-03 RX ORDER — LORAZEPAM 2 MG/1
2 TABLET ORAL NIGHTLY
Qty: 90 TABLET | Refills: 0 | Status: SHIPPED | OUTPATIENT
Start: 2024-07-03 | End: 2024-10-01

## 2024-07-03 RX ORDER — LANSOPRAZOLE 30 MG/1
30 CAPSULE, DELAYED RELEASE ORAL
Qty: 90 CAPSULE | Refills: 1 | Status: SHIPPED | OUTPATIENT
Start: 2024-07-03 | End: 2024-12-30

## 2024-07-03 RX ORDER — MINERAL OIL
180 ENEMA (ML) RECTAL
Qty: 90 TABLET | Refills: 1 | Status: SHIPPED | OUTPATIENT
Start: 2024-07-03 | End: 2024-12-30

## 2024-07-03 ASSESSMENT — ENCOUNTER SYMPTOMS
DYSURIA: 0
CONSTIPATION: 0
SINUS PAIN: 0
SINUS PRESSURE: 0
WHEEZING: 0
ABDOMINAL PAIN: 0
COUGH: 0
DIARRHEA: 0
POLYPHAGIA: 0
CONFUSION: 0
FREQUENCY: 0
POLYDIPSIA: 0
HEADACHES: 0
ADENOPATHY: 0
HEMATURIA: 0
UNEXPECTED WEIGHT CHANGE: 0
CHILLS: 0
NERVOUS/ANXIOUS: 0
SHORTNESS OF BREATH: 0
LIGHT-HEADEDNESS: 0
NAUSEA: 0
DIZZINESS: 0
PALPITATIONS: 0
NUMBNESS: 0
VOMITING: 0
DYSPHORIC MOOD: 0
FEVER: 0
DIAPHORESIS: 0
CHEST TIGHTNESS: 0
SORE THROAT: 0

## 2024-07-03 NOTE — PROGRESS NOTES
Subjective   Patient ID: Paty Guthrie is a 68 y.o. female who presents for Follow-up.    HPI   routine follow up. chronic issues as per assessment and plan.     OARRS:  Dagmar Robin MD on 7/3/2024  1:55 PM  I have personally reviewed the OARRS report for Paty Guthrie. I have considered the risks of abuse, dependence, addiction and diversion and I believe that it is clinically appropriate for Paty Guthrie to be prescribed this medication    Is the patient prescribed a combination of a benzodiazepine and opioid?  No    Last Urine Drug Screen / ordered today: No  Recent Results (from the past 8760 hour(s))   Confirmation Opiate/Opioid/Benzo Prescription Compliance    Collection Time: 12/18/23  3:01 PM   Result Value Ref Range    Clonazepam <25 <25 ng/mL    7-Aminoclonazepam <25 <25 ng/mL    Alprazolam <25 <25 ng/mL    Alpha-Hydroxyalprazolam <25 <25 ng/mL    Midazolam <25 <25 ng/mL    Alpha-Hydroxymidazolam <25 <25 ng/mL    Chlordiazepoxide <25 <25 ng/mL    Diazepam <25 <25 ng/mL    Nordiazepam <25 <25 ng/mL    Temazepam <25 <25 ng/mL    Oxazepam <25 <25 ng/mL    Lorazepam 228 (H) <25 ng/mL    Methadone <25 <25 ng/mL    EDDP <25 <25 ng/mL    6-Acetylmorphine <25 <25 ng/mL    Codeine <50 <50 ng/mL    Hydrocodone <25 <25 ng/mL    Hydromorphone <25 <25 ng/mL    Morphine  <50 <50 ng/mL    Norhydrocodone <25 <25 ng/mL    Noroxycodone <25 <25 ng/mL    Oxycodone <25 <25 ng/mL    Oxymorphone <25 <25 ng/mL    Fentanyl <2.5 <2.5 ng/mL    Norfentanyl <2.5 <2.5 ng/mL    Tramadol <50 <50 ng/mL    O-Desmethyltramadol <50 <50 ng/mL    Zolpidem <25 <25 ng/mL    Zolpidem Metabolite (ZCA) <25 <25 ng/mL   Screen Opiate/Opioid/Benzo Prescription Compliance    Collection Time: 12/18/23  3:01 PM   Result Value Ref Range    Creatinine, Urine Random 13.1 (L) 20.0 - 320.0 mg/dL    Amphetamine Screen, Urine Presumptive Negative Presumptive Negative    Barbiturate Screen, Urine Presumptive Negative Presumptive Negative    Cannabinoid  Screen, Urine Presumptive Negative Presumptive Negative    Cocaine Metabolite Screen, Urine Presumptive Negative Presumptive Negative    PCP Screen, Urine Presumptive Negative Presumptive Negative     Results are as expected.         Controlled Substance Agreement: Benzodiazepines   Date of the Last Agreement: Dec. 2023  Reviewed Controlled Substance Agreement including but not limited to the benefits, risks, and alternatives to treatment with a Controlled Substance medication(s).    Benzodiazepines:  What is the patient's goal of therapy? Reduction of anxiety   Is this being achieved with current treatment? yes    OSVALDO-7:  No data recorded    Activities of Daily Living:   Is your overall impression that this patient is benefiting (symptom reduction outweighs side effects) from benzodiazepine therapy? Yes     1. Physical Functioning: Better  2. Family Relationship: Better  3. Social Relationship: Better  4. Mood: Better  5. Sleep Patterns: Better  6. Overall Function: Better    Review of Systems   Constitutional:  Negative for chills, diaphoresis, fever and unexpected weight change.   HENT:  Negative for congestion, sinus pressure, sinus pain, sneezing and sore throat.    Respiratory:  Negative for cough, chest tightness, shortness of breath and wheezing.    Cardiovascular:  Negative for chest pain, palpitations and leg swelling.   Gastrointestinal:  Negative for abdominal pain, constipation, diarrhea, nausea and vomiting.   Endocrine: Negative for cold intolerance, heat intolerance, polydipsia, polyphagia and polyuria.   Genitourinary:  Negative for dysuria, frequency, hematuria and urgency.   Neurological:  Negative for dizziness, syncope, light-headedness, numbness and headaches.   Hematological:  Negative for adenopathy.   Psychiatric/Behavioral:  Negative for confusion and dysphoric mood. The patient is not nervous/anxious.        Objective   /64 (BP Location: Left arm, Patient Position: Sitting, BP Cuff  Size: Adult)   Pulse 88   Wt 62.6 kg (138 lb)   SpO2 95%   BMI 25.65 kg/m²     Physical Exam  Vitals and nursing note reviewed.   Constitutional:       General: She is not in acute distress.     Appearance: Normal appearance.   HENT:      Head: Normocephalic and atraumatic.      Nose: Nose normal.   Eyes:      Extraocular Movements: Extraocular movements intact.      Conjunctiva/sclera: Conjunctivae normal.      Pupils: Pupils are equal, round, and reactive to light.   Cardiovascular:      Rate and Rhythm: Normal rate and regular rhythm.      Heart sounds: No murmur heard.     No friction rub. No gallop.   Pulmonary:      Effort: Pulmonary effort is normal.      Breath sounds: Normal breath sounds. No wheezing, rhonchi or rales.   Abdominal:      General: Bowel sounds are normal. There is no distension.      Palpations: Abdomen is soft.      Tenderness: There is no abdominal tenderness.   Musculoskeletal:         General: Normal range of motion.      Cervical back: Normal range of motion and neck supple.   Skin:     General: Skin is warm and dry.   Neurological:      General: No focal deficit present.      Mental Status: She is alert and oriented to person, place, and time.      Deep Tendon Reflexes: Reflexes normal.   Psychiatric:         Mood and Affect: Mood normal.         Behavior: Behavior normal.         Thought Content: Thought content normal.         Judgment: Judgment normal.         Assessment/Plan   Problem List Items Addressed This Visit             ICD-10-CM    Allergic rhinitis due to house dust mite J30.89     - controlled. continue fexofenadine          Relevant Medications    fexofenadine (Allegra) 180 mg tablet    Alejo's esophagus with dysplasia K22.719     - follows with GI         BMI 25.0-25.9,adult Z68.25     - Encouraged healthy lifestyle, including adequate exercise and high fiber, low fat and low carb diet.          Encounter for immunization Z23     - declines PCV 20  - recommended  the following vaccines at the pharmacy: Shingrix, RSV, COVID-19         Gastroesophageal reflux disease with esophagitis without hemorrhage - Primary K21.00     - controlled. Continue lansoprazole          Relevant Medications    lansoprazole (Prevacid) 30 mg DR capsule    Hives L50.9     - controlled. continue fexofenadine         Relevant Medications    fexofenadine (Allegra) 180 mg tablet    Irritable bowel syndrome with diarrhea K58.0     - diet controlled.         Leukopenia D72.819     - saw hematology. [place] to be benign. monitor         Osteopenia of multiple sites M85.89     - continue calcium plus D  - continue weight bearing exercises  - repeat DEXA 5/2026         Overweight with body mass index (BMI) of 25 to 25.9 in adult E66.3, Z68.25     - Encouraged healthy lifestyle, including adequate exercise and high fiber, low fat and low carb diet.          Psychophysiological insomnia F51.04     - stable. continue lorazepam. Has tried many other options and has not been successful         Relevant Medications    LORazepam (Ativan) 2 mg tablet    Seasonal allergic rhinitis due to pollen J30.1     - controlled. Continue fexofenadine          Relevant Medications    fexofenadine (Allegra) 180 mg tablet          Normally follows up every 3 months for lorazepam, but as she has been stable on this medication and is due for Medicare wellness exam in 5 months, will have her follow up for both in 5 months and then will follow up every 6 months

## 2024-07-03 NOTE — PATIENT INSTRUCTIONS
Paty Guthrie ,    Thank you for coming in today. We at [facility] appreciate your trust in our care. If you have any questions or concerns about the care you received today, please do not hesitate to contact us at [phone].    The following instructions were discussed today:    - I recommend the following vaccines at the pharmacy: Shingrix (shingles), RSV, COVID-19  - follow up for Medicare Wellness in December

## 2024-07-30 ENCOUNTER — TELEPHONE (OUTPATIENT)
Dept: PRIMARY CARE | Facility: CLINIC | Age: 69
End: 2024-07-30
Payer: MEDICARE

## 2024-07-30 DIAGNOSIS — Z12.31 VISIT FOR SCREENING MAMMOGRAM: Primary | ICD-10-CM

## 2024-07-30 NOTE — TELEPHONE ENCOUNTER
Pt would like an order to have mammogram done at [facility]. Told her I would call her back once it is in system.  Called Paty left message to let her know order is in chart so she can call to schedule. Gave scheduling phone number.

## 2024-08-14 ENCOUNTER — HOSPITAL ENCOUNTER (OUTPATIENT)
Dept: RADIOLOGY | Facility: EXTERNAL LOCATION | Age: 69
Discharge: HOME | End: 2024-08-14

## 2024-08-14 DIAGNOSIS — M25.532 LEFT WRIST PAIN: ICD-10-CM

## 2024-08-20 ENCOUNTER — HOSPITAL ENCOUNTER (OUTPATIENT)
Dept: RADIOLOGY | Facility: CLINIC | Age: 69
Discharge: HOME | End: 2024-08-20
Payer: MEDICARE

## 2024-08-20 VITALS — BODY MASS INDEX: 25.86 KG/M2 | WEIGHT: 137 LBS | HEIGHT: 61 IN

## 2024-08-20 DIAGNOSIS — Z12.31 VISIT FOR SCREENING MAMMOGRAM: ICD-10-CM

## 2024-08-20 PROCEDURE — 77067 SCR MAMMO BI INCL CAD: CPT

## 2024-08-20 PROCEDURE — 77063 BREAST TOMOSYNTHESIS BI: CPT | Performed by: RADIOLOGY

## 2024-08-20 PROCEDURE — 77067 SCR MAMMO BI INCL CAD: CPT | Performed by: RADIOLOGY

## 2024-09-19 ENCOUNTER — TELEPHONE (OUTPATIENT)
Dept: PRIMARY CARE | Facility: CLINIC | Age: 69
End: 2024-09-19
Payer: MEDICARE

## 2024-09-19 DIAGNOSIS — F51.04 PSYCHOPHYSIOLOGICAL INSOMNIA: ICD-10-CM

## 2024-09-19 RX ORDER — LORAZEPAM 2 MG/1
2 TABLET ORAL NIGHTLY
Qty: 90 TABLET | Refills: 0 | Status: SHIPPED | OUTPATIENT
Start: 2024-09-19 | End: 2024-09-19 | Stop reason: SDUPTHER

## 2024-09-19 RX ORDER — LORAZEPAM 2 MG/1
2 TABLET ORAL NIGHTLY
Qty: 90 TABLET | Refills: 0 | Status: SHIPPED | OUTPATIENT
Start: 2024-09-19 | End: 2024-12-18

## 2024-09-19 NOTE — TELEPHONE ENCOUNTER
Last appointment: 9/19/2024  Next appointment: 12/3/2024     Eprescribed. I have personally reviewed the OARRS report.  This report is scanned into the electronic medical record.  I have considered the risks of abuse, dependence, addiction and diversion.  I believe that it is clinically appropriate to prescribe this medication.

## 2024-09-19 NOTE — TELEPHONE ENCOUNTER
Patient is requesting refill on [facility]ep[facility]. Last visit 7/3/24, next visit 12/3/24

## 2024-11-05 ENCOUNTER — APPOINTMENT (OUTPATIENT)
Dept: PRIMARY CARE | Facility: CLINIC | Age: 69
End: 2024-11-05
Payer: MEDICARE

## 2024-12-03 ENCOUNTER — APPOINTMENT (OUTPATIENT)
Dept: PRIMARY CARE | Facility: CLINIC | Age: 69
End: 2024-12-03
Payer: MEDICARE

## 2024-12-03 VITALS
HEIGHT: 61 IN | SYSTOLIC BLOOD PRESSURE: 137 MMHG | HEART RATE: 69 BPM | RESPIRATION RATE: 16 BRPM | DIASTOLIC BLOOD PRESSURE: 80 MMHG | OXYGEN SATURATION: 95 % | WEIGHT: 140 LBS | BODY MASS INDEX: 26.43 KG/M2

## 2024-12-03 DIAGNOSIS — Z23 ENCOUNTER FOR IMMUNIZATION: ICD-10-CM

## 2024-12-03 DIAGNOSIS — M85.89 OSTEOPENIA OF MULTIPLE SITES: ICD-10-CM

## 2024-12-03 DIAGNOSIS — K22.719 BARRETT'S ESOPHAGUS WITH DYSPLASIA: ICD-10-CM

## 2024-12-03 DIAGNOSIS — K58.0 IRRITABLE BOWEL SYNDROME WITH DIARRHEA: ICD-10-CM

## 2024-12-03 DIAGNOSIS — D72.819 LEUKOPENIA, UNSPECIFIED TYPE: ICD-10-CM

## 2024-12-03 DIAGNOSIS — F51.04 PSYCHOPHYSIOLOGICAL INSOMNIA: ICD-10-CM

## 2024-12-03 DIAGNOSIS — J30.89 ALLERGIC RHINITIS DUE TO HOUSE DUST MITE: ICD-10-CM

## 2024-12-03 DIAGNOSIS — J30.1 SEASONAL ALLERGIC RHINITIS DUE TO POLLEN: ICD-10-CM

## 2024-12-03 DIAGNOSIS — Z51.81 THERAPEUTIC DRUG MONITORING: ICD-10-CM

## 2024-12-03 DIAGNOSIS — K21.00 GASTROESOPHAGEAL REFLUX DISEASE WITH ESOPHAGITIS WITHOUT HEMORRHAGE: ICD-10-CM

## 2024-12-03 DIAGNOSIS — Z13.220 LIPID SCREENING: ICD-10-CM

## 2024-12-03 DIAGNOSIS — L50.9 HIVES: ICD-10-CM

## 2024-12-03 DIAGNOSIS — E66.3 OVERWEIGHT WITH BODY MASS INDEX (BMI) OF 26 TO 26.9 IN ADULT: ICD-10-CM

## 2024-12-03 DIAGNOSIS — Z00.00 MEDICARE ANNUAL WELLNESS VISIT, SUBSEQUENT: Primary | ICD-10-CM

## 2024-12-03 DIAGNOSIS — R53.83 FATIGUE, UNSPECIFIED TYPE: ICD-10-CM

## 2024-12-03 PROBLEM — Z13.31 POSITIVE SCREENING FOR DEPRESSION ON 9-ITEM PATIENT HEALTH QUESTIONNAIRE (PHQ-9): Status: ACTIVE | Noted: 2024-12-03

## 2024-12-03 PROCEDURE — 1036F TOBACCO NON-USER: CPT | Performed by: FAMILY MEDICINE

## 2024-12-03 PROCEDURE — 80368 SEDATIVE HYPNOTICS: CPT

## 2024-12-03 PROCEDURE — 99214 OFFICE O/P EST MOD 30 MIN: CPT | Performed by: FAMILY MEDICINE

## 2024-12-03 PROCEDURE — 80354 DRUG SCREENING FENTANYL: CPT

## 2024-12-03 PROCEDURE — 1159F MED LIST DOCD IN RCRD: CPT | Performed by: FAMILY MEDICINE

## 2024-12-03 PROCEDURE — 80361 OPIATES 1 OR MORE: CPT

## 2024-12-03 PROCEDURE — 80365 DRUG SCREENING OXYCODONE: CPT

## 2024-12-03 PROCEDURE — 80346 BENZODIAZEPINES1-12: CPT

## 2024-12-03 PROCEDURE — 1170F FXNL STATUS ASSESSED: CPT | Performed by: FAMILY MEDICINE

## 2024-12-03 PROCEDURE — 80373 DRUG SCREENING TRAMADOL: CPT

## 2024-12-03 PROCEDURE — 1158F ADVNC CARE PLAN TLK DOCD: CPT | Performed by: FAMILY MEDICINE

## 2024-12-03 PROCEDURE — G0439 PPPS, SUBSEQ VISIT: HCPCS | Performed by: FAMILY MEDICINE

## 2024-12-03 PROCEDURE — 1123F ACP DISCUSS/DSCN MKR DOCD: CPT | Performed by: FAMILY MEDICINE

## 2024-12-03 PROCEDURE — 81003 URINALYSIS AUTO W/O SCOPE: CPT

## 2024-12-03 PROCEDURE — 80358 DRUG SCREENING METHADONE: CPT

## 2024-12-03 PROCEDURE — 82570 ASSAY OF URINE CREATININE: CPT

## 2024-12-03 PROCEDURE — 1160F RVW MEDS BY RX/DR IN RCRD: CPT | Performed by: FAMILY MEDICINE

## 2024-12-03 PROCEDURE — 80307 DRUG TEST PRSMV CHEM ANLYZR: CPT

## 2024-12-03 PROCEDURE — 3008F BODY MASS INDEX DOCD: CPT | Performed by: FAMILY MEDICINE

## 2024-12-03 RX ORDER — LORAZEPAM 2 MG/1
2 TABLET ORAL NIGHTLY
Qty: 90 TABLET | Refills: 0 | Status: SHIPPED | OUTPATIENT
Start: 2024-12-03 | End: 2025-03-03

## 2024-12-03 ASSESSMENT — ENCOUNTER SYMPTOMS
CHEST TIGHTNESS: 0
DIAPHORESIS: 0
SINUS PAIN: 0
CHILLS: 0
VOMITING: 0
HEMATURIA: 0
COUGH: 0
FEVER: 0
LIGHT-HEADEDNESS: 0
NERVOUS/ANXIOUS: 0
SHORTNESS OF BREATH: 0
DIZZINESS: 0
POLYDIPSIA: 0
NAUSEA: 0
WHEEZING: 0
CONFUSION: 0
POLYPHAGIA: 0
DYSURIA: 0
FREQUENCY: 0
ABDOMINAL PAIN: 0
UNEXPECTED WEIGHT CHANGE: 0
DIARRHEA: 0
SINUS PRESSURE: 0
SORE THROAT: 0
ADENOPATHY: 0
PALPITATIONS: 0
DYSPHORIC MOOD: 0
CONSTIPATION: 0
NUMBNESS: 0
HEADACHES: 0

## 2024-12-03 ASSESSMENT — ACTIVITIES OF DAILY LIVING (ADL)
DRESSING: INDEPENDENT
TAKING_MEDICATION: INDEPENDENT
BATHING: INDEPENDENT
GROCERY_SHOPPING: INDEPENDENT
DOING_HOUSEWORK: INDEPENDENT
MANAGING_FINANCES: INDEPENDENT

## 2024-12-03 ASSESSMENT — ANXIETY QUESTIONNAIRES
IF YOU CHECKED OFF ANY PROBLEMS ON THIS QUESTIONNAIRE, HOW DIFFICULT HAVE THESE PROBLEMS MADE IT FOR YOU TO DO YOUR WORK, TAKE CARE OF THINGS AT HOME, OR GET ALONG WITH OTHER PEOPLE: NOT DIFFICULT AT ALL
5. BEING SO RESTLESS THAT IT IS HARD TO SIT STILL: NOT AT ALL
3. WORRYING TOO MUCH ABOUT DIFFERENT THINGS: NOT AT ALL
1. FEELING NERVOUS, ANXIOUS, OR ON EDGE: NOT AT ALL
GAD7 TOTAL SCORE: 0
4. TROUBLE RELAXING: NOT AT ALL
7. FEELING AFRAID AS IF SOMETHING AWFUL MIGHT HAPPEN: NOT AT ALL
6. BECOMING EASILY ANNOYED OR IRRITABLE: NOT AT ALL
2. NOT BEING ABLE TO STOP OR CONTROL WORRYING: NOT AT ALL

## 2024-12-03 ASSESSMENT — PATIENT HEALTH QUESTIONNAIRE - PHQ9
8. MOVING OR SPEAKING SO SLOWLY THAT OTHER PEOPLE COULD HAVE NOTICED. OR THE OPPOSITE, BEING SO FIGETY OR RESTLESS THAT YOU HAVE BEEN MOVING AROUND A LOT MORE THAN USUAL: NOT AT ALL
SUM OF ALL RESPONSES TO PHQ QUESTIONS 1-9: 6
9. THOUGHTS THAT YOU WOULD BE BETTER OFF DEAD, OR OF HURTING YOURSELF: NOT AT ALL
6. FEELING BAD ABOUT YOURSELF - OR THAT YOU ARE A FAILURE OR HAVE LET YOURSELF OR YOUR FAMILY DOWN: NOT AT ALL
10. IF YOU CHECKED OFF ANY PROBLEMS, HOW DIFFICULT HAVE THESE PROBLEMS MADE IT FOR YOU TO DO YOUR WORK, TAKE CARE OF THINGS AT HOME, OR GET ALONG WITH OTHER PEOPLE: NOT DIFFICULT AT ALL
1. LITTLE INTEREST OR PLEASURE IN DOING THINGS: NOT AT ALL
5. POOR APPETITE OR OVEREATING: NOT AT ALL
2. FEELING DOWN, DEPRESSED OR HOPELESS: NOT AT ALL
7. TROUBLE CONCENTRATING ON THINGS, SUCH AS READING THE NEWSPAPER OR WATCHING TELEVISION: NEARLY EVERY DAY
3. TROUBLE FALLING OR STAYING ASLEEP: MORE THAN HALF THE DAYS
SUM OF ALL RESPONSES TO PHQ9 QUESTIONS 1 AND 2: 0
4. FEELING TIRED OR HAVING LITTLE ENERGY: SEVERAL DAYS

## 2024-12-03 NOTE — ASSESSMENT & PLAN NOTE
- controlled. continue fexofenadine     Orders:    Vitamin B12; Future    CBC and Auto Differential; Future    Comprehensive Metabolic Panel; Future    TSH with reflex to Free T4 if abnormal; Future

## 2024-12-03 NOTE — ASSESSMENT & PLAN NOTE
- declines PCV 20  - declines flu shot   - recommended the following vaccines at the pharmacy: Shingrix, COVID-19

## 2024-12-03 NOTE — ASSESSMENT & PLAN NOTE
- follows with GI    Orders:    Vitamin B12; Future    CBC and Auto Differential; Future    Comprehensive Metabolic Panel; Future    TSH with reflex to Free T4 if abnormal; Future

## 2024-12-03 NOTE — ASSESSMENT & PLAN NOTE
- Encouraged healthy lifestyle, including adequate exercise and high fiber, low fat and low carb diet.     Orders:    Vitamin B12; Future    CBC and Auto Differential; Future    Comprehensive Metabolic Panel; Future    TSH with reflex to Free T4 if abnormal; Future

## 2024-12-03 NOTE — PROGRESS NOTES
Subjective   Reason for Visit: Paty Guthrie is an 69 y.o. female here for a Medicare Wellness visit.     Past Medical, Surgical, and Family History reviewed and updated in chart.    Reviewed all medications by prescribing practitioner or clinical pharmacist (such as prescriptions, OTCs, herbal therapies and supplements) and documented in the medical record.    HPI  routine follow up. chronic issues as per assessment and plan.     OARRS:  Dagmar Robin MD on 12/3/2024  1:31 PM  I have personally reviewed the OARRS report for Paty Guthrie. I have considered the risks of abuse, dependence, addiction and diversion and I believe that it is clinically appropriate for Paty Guthrie to be prescribed this medication    Is the patient prescribed a combination of a benzodiazepine and opioid?  No    Last Urine Drug Screen / ordered today: Yes  Recent Results (from the past 8760 hours)   Confirmation Opiate/Opioid/Benzo Prescription Compliance    Collection Time: 12/18/23  3:01 PM   Result Value Ref Range    Clonazepam <25 <25 ng/mL    7-Aminoclonazepam <25 <25 ng/mL    Alprazolam <25 <25 ng/mL    Alpha-Hydroxyalprazolam <25 <25 ng/mL    Midazolam <25 <25 ng/mL    Alpha-Hydroxymidazolam <25 <25 ng/mL    Chlordiazepoxide <25 <25 ng/mL    Diazepam <25 <25 ng/mL    Nordiazepam <25 <25 ng/mL    Temazepam <25 <25 ng/mL    Oxazepam <25 <25 ng/mL    Lorazepam 228 (H) <25 ng/mL    Methadone <25 <25 ng/mL    EDDP <25 <25 ng/mL    6-Acetylmorphine <25 <25 ng/mL    Codeine <50 <50 ng/mL    Hydrocodone <25 <25 ng/mL    Hydromorphone <25 <25 ng/mL    Morphine  <50 <50 ng/mL    Norhydrocodone <25 <25 ng/mL    Noroxycodone <25 <25 ng/mL    Oxycodone <25 <25 ng/mL    Oxymorphone <25 <25 ng/mL    Fentanyl <2.5 <2.5 ng/mL    Norfentanyl <2.5 <2.5 ng/mL    Tramadol <50 <50 ng/mL    O-Desmethyltramadol <50 <50 ng/mL    Zolpidem <25 <25 ng/mL    Zolpidem Metabolite (ZCA) <25 <25 ng/mL   Screen Opiate/Opioid/Benzo Prescription Compliance     Collection Time: 23  3:01 PM   Result Value Ref Range    Creatinine, Urine Random 13.1 (L) 20.0 - 320.0 mg/dL    Amphetamine Screen, Urine Presumptive Negative Presumptive Negative    Barbiturate Screen, Urine Presumptive Negative Presumptive Negative    Cannabinoid Screen, Urine Presumptive Negative Presumptive Negative    Cocaine Metabolite Screen, Urine Presumptive Negative Presumptive Negative    PCP Screen, Urine Presumptive Negative Presumptive Negative     N/A        Controlled Substance Agreement: Benzodiazepines   Date of the Last Agreement: 24   Reviewed Controlled Substance Agreement including but not limited to the benefits, risks, and alternatives to treatment with a Controlled Substance medication(s).    Benzodiazepines:  What is the patient's goal of therapy? Initiation and maintenanc of sleep   Is this being achieved with current treatment? yes    OSVALDO-7:  Over the last 2 weeks, how often have you been bothered by any of the following problems?  Feeling nervous, anxious, or on edge: 0  Not being able to stop or control worryin  Worrying too much about different things: 0  Trouble relaxin  Being so restless that it is hard to sit still: 0  Becoming easily annoyed or irritable: 0  Feeling afraid as if something awful might happen: 0  OSVALDO-7 Total Score: 0        Activities of Daily Living:   Is your overall impression that this patient is benefiting (symptom reduction outweighs side effects) from benzodiazepine therapy? Yes     1. Physical Functioning: Better  2. Family Relationship: Better  3. Social Relationship: Better  4. Mood: Better  5. Sleep Patterns: Better  6. Overall Function: Better    Patient Care Team:  Dagmar Robin MD as PCP - General (Family Medicine)  Dagmar Robin MD as PCP - O Medicare Advantage PCP     Review of Systems   Constitutional:  Negative for chills, diaphoresis, fever and unexpected weight change.   HENT:  Negative for congestion, sinus pressure, sinus  "pain, sneezing and sore throat.    Respiratory:  Negative for cough, chest tightness, shortness of breath and wheezing.    Cardiovascular:  Negative for chest pain, palpitations and leg swelling.   Gastrointestinal:  Negative for abdominal pain, constipation, diarrhea, nausea and vomiting.   Endocrine: Negative for cold intolerance, heat intolerance, polydipsia, polyphagia and polyuria.   Genitourinary:  Negative for dysuria, frequency, hematuria and urgency.   Neurological:  Negative for dizziness, syncope, light-headedness, numbness and headaches.   Hematological:  Negative for adenopathy.   Psychiatric/Behavioral:  Negative for confusion and dysphoric mood. The patient is not nervous/anxious.        Objective   Vitals:  /80 (BP Location: Right arm, Patient Position: Sitting, BP Cuff Size: Adult)   Pulse 69   Resp 16   Ht 1.549 m (5' 1\")   Wt 63.5 kg (140 lb)   SpO2 95%   BMI 26.45 kg/m²       Physical Exam  Vitals and nursing note reviewed.   Constitutional:       General: She is not in acute distress.     Appearance: Normal appearance.   HENT:      Head: Normocephalic and atraumatic.      Nose: Nose normal.   Eyes:      Extraocular Movements: Extraocular movements intact.      Conjunctiva/sclera: Conjunctivae normal.      Pupils: Pupils are equal, round, and reactive to light.   Cardiovascular:      Rate and Rhythm: Normal rate and regular rhythm.      Heart sounds: No murmur heard.     No friction rub. No gallop.   Pulmonary:      Effort: Pulmonary effort is normal.      Breath sounds: Normal breath sounds. No wheezing, rhonchi or rales.   Abdominal:      General: Bowel sounds are normal. There is no distension.      Palpations: Abdomen is soft.      Tenderness: There is no abdominal tenderness.   Musculoskeletal:         General: Normal range of motion.      Cervical back: Normal range of motion and neck supple.   Skin:     General: Skin is warm and dry.   Neurological:      General: No focal " deficit present.      Mental Status: She is alert and oriented to person, place, and time.      Deep Tendon Reflexes: Reflexes normal.   Psychiatric:         Mood and Affect: Mood normal.         Behavior: Behavior normal.         Thought Content: Thought content normal.         Judgment: Judgment normal.         Assessment & Plan  Medicare annual wellness visit, subsequent         Allergic rhinitis due to house dust mite  - controlled. continue fexofenadine     Orders:    Vitamin B12; Future    CBC and Auto Differential; Future    Comprehensive Metabolic Panel; Future    TSH with reflex to Free T4 if abnormal; Future    Alejo's esophagus with dysplasia  - follows with GI    Orders:    Vitamin B12; Future    CBC and Auto Differential; Future    Comprehensive Metabolic Panel; Future    TSH with reflex to Free T4 if abnormal; Future    Overweight with body mass index (BMI) of 26 to 26.9 in adult  - Encouraged healthy lifestyle, including adequate exercise and high fiber, low fat and low carb diet.     Orders:    Vitamin B12; Future    CBC and Auto Differential; Future    Comprehensive Metabolic Panel; Future    TSH with reflex to Free T4 if abnormal; Future    BMI 26.0-26.9,adult  - Encouraged healthy lifestyle, including adequate exercise and high fiber, low fat and low carb diet.     Orders:    Vitamin B12; Future    CBC and Auto Differential; Future    Comprehensive Metabolic Panel; Future    TSH with reflex to Free T4 if abnormal; Future    Gastroesophageal reflux disease with esophagitis without hemorrhage  - controlled. Continue lansoprazole     Orders:    Vitamin B12; Future    CBC and Auto Differential; Future    Comprehensive Metabolic Panel; Future    TSH with reflex to Free T4 if abnormal; Future    Hives  - controlled. continue fexofenadine    Orders:    Vitamin B12; Future    CBC and Auto Differential; Future    Comprehensive Metabolic Panel; Future    TSH with reflex to Free T4 if abnormal;  Future    Irritable bowel syndrome with diarrhea  - diet controlled.    Orders:    Vitamin B12; Future    CBC and Auto Differential; Future    Comprehensive Metabolic Panel; Future    TSH with reflex to Free T4 if abnormal; Future    Leukopenia, unspecified type  - saw hematology. [place] to be benign. monitor    Orders:    Vitamin B12; Future    CBC and Auto Differential; Future    Comprehensive Metabolic Panel; Future    TSH with reflex to Free T4 if abnormal; Future    Osteopenia of multiple sites  - continue calcium plus D  - continue weight bearing exercises  - repeat DEXA 5/2026    Orders:    Vitamin B12; Future    CBC and Auto Differential; Future    Comprehensive Metabolic Panel; Future    TSH with reflex to Free T4 if abnormal; Future    Psychophysiological insomnia  - stable. continue lorazepam. Has tried many other options and has not been successful    Orders:    Vitamin B12; Future    CBC and Auto Differential; Future    Comprehensive Metabolic Panel; Future    TSH with reflex to Free T4 if abnormal; Future    LORazepam (Ativan) 2 mg tablet; Take 1 tablet (2 mg) by mouth once daily at bedtime.    Seasonal allergic rhinitis due to pollen  - controlled. Continue fexofenadine     Orders:    Vitamin B12; Future    CBC and Auto Differential; Future    Comprehensive Metabolic Panel; Future    TSH with reflex to Free T4 if abnormal; Future    Fatigue, unspecified type    Orders:    TSH with reflex to Free T4 if abnormal; Future    Therapeutic drug monitoring    Orders:    Opiate/Opioid/Benzo Prescription Compliance    OOB Internal Tracking    Encounter for immunization  - declines PCV 20  - declines flu shot   - recommended the following vaccines at the pharmacy: Shingrix, COVID-19         Lipid screening    Orders:    Lipid Panel; Future

## 2024-12-03 NOTE — ASSESSMENT & PLAN NOTE
- diet controlled.    Orders:    Vitamin B12; Future    CBC and Auto Differential; Future    Comprehensive Metabolic Panel; Future    TSH with reflex to Free T4 if abnormal; Future

## 2024-12-03 NOTE — ASSESSMENT & PLAN NOTE
- controlled. Continue lansoprazole     Orders:    Vitamin B12; Future    CBC and Auto Differential; Future    Comprehensive Metabolic Panel; Future    TSH with reflex to Free T4 if abnormal; Future

## 2024-12-03 NOTE — ASSESSMENT & PLAN NOTE
- continue calcium plus D  - continue weight bearing exercises  - repeat DEXA 5/2026    Orders:    Vitamin B12; Future    CBC and Auto Differential; Future    Comprehensive Metabolic Panel; Future    TSH with reflex to Free T4 if abnormal; Future

## 2024-12-03 NOTE — ASSESSMENT & PLAN NOTE
- saw hematology. [place] to be benign. monitor    Orders:    Vitamin B12; Future    CBC and Auto Differential; Future    Comprehensive Metabolic Panel; Future    TSH with reflex to Free T4 if abnormal; Future

## 2024-12-03 NOTE — PATIENT INSTRUCTIONS
Paty Guthrie ,    Thank you for coming in today. We at [facility] appreciate your trust in our care. If you have any questions or concerns about the care you received today, please do not hesitate to contact us at [phone].    The following instructions were discussed today:    - Follow up in 6 months   - Please get blood work done 1-2 weeks prior to your next visit. For blood work: Nothing to eat or drink for at least 10 hours prior. Okay for water or black coffee.   - I recommend the following vaccines at the pharmacy: Shingrix, COVID-19

## 2024-12-04 LAB
AMPHETAMINES UR QL SCN: ABNORMAL
BARBITURATES UR QL SCN: ABNORMAL
BZE UR QL SCN: ABNORMAL
CANNABINOIDS UR QL SCN: ABNORMAL
CREAT UR-MCNC: 11.5 MG/DL (ref 20–320)
PCP UR QL SCN: ABNORMAL
SP GR UR STRIP.AUTO: 1

## 2024-12-05 LAB
1OH-MIDAZOLAM UR CFM-MCNC: <25 NG/ML
6MAM UR CFM-MCNC: <25 NG/ML
7AMINOCLONAZEPAM UR CFM-MCNC: <25 NG/ML
A-OH ALPRAZ UR CFM-MCNC: <25 NG/ML
ALPRAZ UR CFM-MCNC: <25 NG/ML
CHLORDIAZEP UR CFM-MCNC: <25 NG/ML
CLONAZEPAM UR CFM-MCNC: <25 NG/ML
CODEINE UR CFM-MCNC: <50 NG/ML
DIAZEPAM UR CFM-MCNC: <25 NG/ML
EDDP UR CFM-MCNC: <25 NG/ML
FENTANYL UR CFM-MCNC: <2.5 NG/ML
HYDROCODONE CTO UR CFM-MCNC: <25 NG/ML
HYDROMORPHONE UR CFM-MCNC: <25 NG/ML
LORAZEPAM UR CFM-MCNC: 259 NG/ML
METHADONE UR CFM-MCNC: <25 NG/ML
MIDAZOLAM UR CFM-MCNC: <25 NG/ML
MORPHINE UR CFM-MCNC: <50 NG/ML
NORDIAZEPAM UR CFM-MCNC: <25 NG/ML
NORFENTANYL UR CFM-MCNC: <2.5 NG/ML
NORHYDROCODONE UR CFM-MCNC: <25 NG/ML
NOROXYCODONE UR CFM-MCNC: <25 NG/ML
NORTRAMADOL UR-MCNC: <50 NG/ML
OXAZEPAM UR CFM-MCNC: <25 NG/ML
OXYCODONE UR CFM-MCNC: <25 NG/ML
OXYMORPHONE UR CFM-MCNC: <25 NG/ML
TEMAZEPAM UR CFM-MCNC: <25 NG/ML
TRAMADOL UR CFM-MCNC: <50 NG/ML
ZOLPIDEM UR CFM-MCNC: <25 NG/ML
ZOLPIDEM UR-MCNC: <25 NG/ML

## 2025-01-27 DIAGNOSIS — K21.00 GASTROESOPHAGEAL REFLUX DISEASE WITH ESOPHAGITIS WITHOUT HEMORRHAGE: ICD-10-CM

## 2025-01-28 RX ORDER — LANSOPRAZOLE 30 MG/1
30 CAPSULE, DELAYED RELEASE ORAL
Qty: 90 CAPSULE | Refills: 1 | Status: SHIPPED | OUTPATIENT
Start: 2025-01-28 | End: 2025-07-27

## 2025-02-26 ENCOUNTER — TELEPHONE (OUTPATIENT)
Dept: PRIMARY CARE | Facility: CLINIC | Age: 70
End: 2025-02-26
Payer: MEDICARE

## 2025-02-26 DIAGNOSIS — F51.04 PSYCHOPHYSIOLOGICAL INSOMNIA: ICD-10-CM

## 2025-02-26 RX ORDER — LORAZEPAM 2 MG/1
2 TABLET ORAL NIGHTLY
Qty: 90 TABLET | Refills: 0 | Status: SHIPPED | OUTPATIENT
Start: 2025-02-26 | End: 2025-05-27

## 2025-02-26 NOTE — TELEPHONE ENCOUNTER
MEDICATION NAME: lorazepam (pended)  STRENGTH:  DIRECTIONS:  PHARMACY:  LAST OV:  12.3.24  NEXT OV: 5.29.25

## 2025-02-26 NOTE — TELEPHONE ENCOUNTER
Last appointment: 12/3/2024  Next appointment: 5/29/2025     Eprescribed. I have personally reviewed the OARRS report.  This report is scanned into the electronic medical record.  I have considered the risks of abuse, dependence, addiction and diversion.  I believe that it is clinically appropriate to prescribe this medication.

## 2025-05-14 PROBLEM — D58.2 ELEVATED HEMOGLOBIN: Status: ACTIVE | Noted: 2025-05-14

## 2025-05-15 LAB
ALBUMIN SERPL-MCNC: 4.2 G/DL (ref 3.6–5.1)
ALP SERPL-CCNC: 49 U/L (ref 37–153)
ALT SERPL-CCNC: 17 U/L (ref 6–29)
ANION GAP SERPL CALCULATED.4IONS-SCNC: 12 MMOL/L (CALC) (ref 7–17)
AST SERPL-CCNC: 29 U/L (ref 10–35)
BASOPHILS # BLD AUTO: 62 CELLS/UL (ref 0–200)
BASOPHILS NFR BLD AUTO: 1.4 %
BILIRUB SERPL-MCNC: 0.7 MG/DL (ref 0.2–1.2)
BUN SERPL-MCNC: 7 MG/DL (ref 7–25)
CALCIUM SERPL-MCNC: 9.5 MG/DL (ref 8.6–10.4)
CHLORIDE SERPL-SCNC: 102 MMOL/L (ref 98–110)
CHOLEST SERPL-MCNC: 198 MG/DL
CHOLEST/HDLC SERPL: 2.5 (CALC)
CO2 SERPL-SCNC: 22 MMOL/L (ref 20–32)
CREAT SERPL-MCNC: 0.85 MG/DL (ref 0.5–1.05)
EGFRCR SERPLBLD CKD-EPI 2021: 75 ML/MIN/1.73M2
EOSINOPHIL # BLD AUTO: 339 CELLS/UL (ref 15–500)
EOSINOPHIL NFR BLD AUTO: 7.7 %
ERYTHROCYTE [DISTWIDTH] IN BLOOD BY AUTOMATED COUNT: 12.5 % (ref 11–15)
FERRITIN SERPL-MCNC: NORMAL NG/ML
GLUCOSE SERPL-MCNC: 88 MG/DL (ref 65–99)
HCT VFR BLD AUTO: 48.5 % (ref 35–45)
HDLC SERPL-MCNC: 78 MG/DL
HGB BLD-MCNC: 16.2 G/DL (ref 11.7–15.5)
IRON SATN MFR SERPL: 37 % (CALC) (ref 16–45)
IRON SERPL-MCNC: 133 MCG/DL (ref 45–160)
LDLC SERPL CALC-MCNC: 102 MG/DL (CALC)
LYMPHOCYTES # BLD AUTO: 1399 CELLS/UL (ref 850–3900)
LYMPHOCYTES NFR BLD AUTO: 31.8 %
MCH RBC QN AUTO: 32.9 PG (ref 27–33)
MCHC RBC AUTO-ENTMCNC: 33.4 G/DL (ref 32–36)
MCV RBC AUTO: 98.4 FL (ref 80–100)
MONOCYTES # BLD AUTO: 409 CELLS/UL (ref 200–950)
MONOCYTES NFR BLD AUTO: 9.3 %
NEUTROPHILS # BLD AUTO: 2191 CELLS/UL (ref 1500–7800)
NEUTROPHILS NFR BLD AUTO: 49.8 %
NONHDLC SERPL-MCNC: 120 MG/DL (CALC)
PLATELET # BLD AUTO: 220 THOUSAND/UL (ref 140–400)
PMV BLD REES-ECKER: 10.2 FL (ref 7.5–12.5)
POTASSIUM SERPL-SCNC: 4.9 MMOL/L (ref 3.5–5.3)
PROT SERPL-MCNC: 6.7 G/DL (ref 6.1–8.1)
RBC # BLD AUTO: 4.93 MILLION/UL (ref 3.8–5.1)
SODIUM SERPL-SCNC: 136 MMOL/L (ref 135–146)
TIBC SERPL-MCNC: 360 MCG/DL (CALC) (ref 250–450)
TRIGL SERPL-MCNC: 86 MG/DL
TSH SERPL-ACNC: 3.01 MIU/L (ref 0.4–4.5)
VIT B12 SERPL-MCNC: 615 PG/ML (ref 200–1100)
WBC # BLD AUTO: 4.4 THOUSAND/UL (ref 3.8–10.8)

## 2025-05-16 ENCOUNTER — TELEPHONE (OUTPATIENT)
Dept: PRIMARY CARE | Facility: CLINIC | Age: 70
End: 2025-05-16
Payer: MEDICARE

## 2025-05-16 NOTE — TELEPHONE ENCOUNTER
"Received a fax from [facility] this morning and it looks like within the Iron panel the Ferritin wasn't performed. Note states \"previous testing and related handling prevents us from utilizing this sample for the additional test requested\".   "

## 2025-05-29 ENCOUNTER — APPOINTMENT (OUTPATIENT)
Dept: PRIMARY CARE | Facility: CLINIC | Age: 70
End: 2025-05-29
Payer: MEDICARE

## 2025-05-29 VITALS
WEIGHT: 142 LBS | DIASTOLIC BLOOD PRESSURE: 80 MMHG | HEART RATE: 80 BPM | HEIGHT: 61 IN | SYSTOLIC BLOOD PRESSURE: 138 MMHG | BODY MASS INDEX: 26.81 KG/M2 | OXYGEN SATURATION: 95 %

## 2025-05-29 DIAGNOSIS — Z13.6 SCREENING FOR ISCHEMIC HEART DISEASE: ICD-10-CM

## 2025-05-29 DIAGNOSIS — F51.04 PSYCHOPHYSIOLOGICAL INSOMNIA: ICD-10-CM

## 2025-05-29 DIAGNOSIS — R94.31 PROLONGED Q-T INTERVAL ON ECG: ICD-10-CM

## 2025-05-29 DIAGNOSIS — M85.89 OSTEOPENIA OF MULTIPLE SITES: ICD-10-CM

## 2025-05-29 DIAGNOSIS — K58.0 IRRITABLE BOWEL SYNDROME WITH DIARRHEA: ICD-10-CM

## 2025-05-29 DIAGNOSIS — D72.819 LEUKOPENIA, UNSPECIFIED TYPE: ICD-10-CM

## 2025-05-29 DIAGNOSIS — T73.3XXA FATIGUE DUE TO EXCESSIVE EXERTION, INITIAL ENCOUNTER: ICD-10-CM

## 2025-05-29 DIAGNOSIS — R71.8 ELEVATED HEMATOCRIT: Primary | ICD-10-CM

## 2025-05-29 DIAGNOSIS — J30.89 ALLERGIC RHINITIS DUE TO HOUSE DUST MITE: ICD-10-CM

## 2025-05-29 DIAGNOSIS — L50.9 HIVES: ICD-10-CM

## 2025-05-29 DIAGNOSIS — R00.2 PALPITATIONS: ICD-10-CM

## 2025-05-29 DIAGNOSIS — K21.00 GASTROESOPHAGEAL REFLUX DISEASE WITH ESOPHAGITIS WITHOUT HEMORRHAGE: ICD-10-CM

## 2025-05-29 DIAGNOSIS — D58.2 ELEVATED HEMOGLOBIN: ICD-10-CM

## 2025-05-29 DIAGNOSIS — E66.3 OVERWEIGHT WITH BODY MASS INDEX (BMI) OF 26 TO 26.9 IN ADULT: ICD-10-CM

## 2025-05-29 DIAGNOSIS — E78.2 MIXED HYPERLIPIDEMIA: ICD-10-CM

## 2025-05-29 DIAGNOSIS — J30.1 SEASONAL ALLERGIC RHINITIS DUE TO POLLEN: ICD-10-CM

## 2025-05-29 DIAGNOSIS — K22.719 BARRETT'S ESOPHAGUS WITH DYSPLASIA: ICD-10-CM

## 2025-05-29 PROBLEM — Z13.31 POSITIVE SCREENING FOR DEPRESSION ON 9-ITEM PATIENT HEALTH QUESTIONNAIRE (PHQ-9): Status: RESOLVED | Noted: 2024-12-03 | Resolved: 2025-05-29

## 2025-05-29 PROBLEM — R53.83 FATIGUE: Status: ACTIVE | Noted: 2025-05-29

## 2025-05-29 RX ORDER — FEXOFENADINE HCL 180 MG/1
180 TABLET ORAL
Qty: 90 TABLET | Refills: 1 | Status: SHIPPED | OUTPATIENT
Start: 2025-05-29 | End: 2025-11-25

## 2025-05-29 RX ORDER — LORAZEPAM 2 MG/1
2 TABLET ORAL NIGHTLY
Qty: 90 TABLET | Refills: 0 | Status: SHIPPED | OUTPATIENT
Start: 2025-05-29 | End: 2025-08-27

## 2025-05-29 ASSESSMENT — ENCOUNTER SYMPTOMS
UNEXPECTED WEIGHT CHANGE: 0
SORE THROAT: 0
PALPITATIONS: 1
VOMITING: 0
LIGHT-HEADEDNESS: 0
POLYPHAGIA: 0
CHILLS: 0
POLYDIPSIA: 0
NUMBNESS: 0
DYSURIA: 0
CONSTIPATION: 0
SHORTNESS OF BREATH: 0
FEVER: 0
ADENOPATHY: 0
HEMATURIA: 0
FREQUENCY: 0
DIAPHORESIS: 0
FATIGUE: 1
WHEEZING: 0
CONFUSION: 0
DIZZINESS: 0
DIARRHEA: 0
COUGH: 0
DYSPHORIC MOOD: 0
NAUSEA: 0
ABDOMINAL PAIN: 0
SINUS PAIN: 0
SINUS PRESSURE: 0
CHEST TIGHTNESS: 0
NERVOUS/ANXIOUS: 0
HEADACHES: 0

## 2025-05-29 ASSESSMENT — ANXIETY QUESTIONNAIRES
IF YOU CHECKED OFF ANY PROBLEMS ON THIS QUESTIONNAIRE, HOW DIFFICULT HAVE THESE PROBLEMS MADE IT FOR YOU TO DO YOUR WORK, TAKE CARE OF THINGS AT HOME, OR GET ALONG WITH OTHER PEOPLE: SOMEWHAT DIFFICULT
7. FEELING AFRAID AS IF SOMETHING AWFUL MIGHT HAPPEN: NOT AT ALL
3. WORRYING TOO MUCH ABOUT DIFFERENT THINGS: SEVERAL DAYS
GAD7 TOTAL SCORE: 8
4. TROUBLE RELAXING: MORE THAN HALF THE DAYS
2. NOT BEING ABLE TO STOP OR CONTROL WORRYING: MORE THAN HALF THE DAYS
5. BEING SO RESTLESS THAT IT IS HARD TO SIT STILL: SEVERAL DAYS
6. BECOMING EASILY ANNOYED OR IRRITABLE: NOT AT ALL
1. FEELING NERVOUS, ANXIOUS, OR ON EDGE: MORE THAN HALF THE DAYS

## 2025-05-29 ASSESSMENT — PATIENT HEALTH QUESTIONNAIRE - PHQ9
1. LITTLE INTEREST OR PLEASURE IN DOING THINGS: NOT AT ALL
8. MOVING OR SPEAKING SO SLOWLY THAT OTHER PEOPLE COULD HAVE NOTICED. OR THE OPPOSITE, BEING SO FIGETY OR RESTLESS THAT YOU HAVE BEEN MOVING AROUND A LOT MORE THAN USUAL: NOT AT ALL
9. THOUGHTS THAT YOU WOULD BE BETTER OFF DEAD, OR OF HURTING YOURSELF: NOT AT ALL
2. FEELING DOWN, DEPRESSED OR HOPELESS: NOT AT ALL
2. FEELING DOWN, DEPRESSED OR HOPELESS: NOT AT ALL
SUM OF ALL RESPONSES TO PHQ9 QUESTIONS 1 AND 2: 0
SUM OF ALL RESPONSES TO PHQ9 QUESTIONS 1 & 2: 0
7. TROUBLE CONCENTRATING ON THINGS, SUCH AS READING THE NEWSPAPER OR WATCHING TELEVISION: SEVERAL DAYS
6. FEELING BAD ABOUT YOURSELF - OR THAT YOU ARE A FAILURE OR HAVE LET YOURSELF OR YOUR FAMILY DOWN: NOT AT ALL
3. TROUBLE FALLING OR STAYING ASLEEP: SEVERAL DAYS
4. FEELING TIRED OR HAVING LITTLE ENERGY: NEARLY EVERY DAY
SUM OF ALL RESPONSES TO PHQ QUESTIONS 1-9: 5
5. POOR APPETITE OR OVEREATING: NOT AT ALL
1. LITTLE INTEREST OR PLEASURE IN DOING THINGS: NOT AT ALL

## 2025-05-29 NOTE — PROGRESS NOTES
Subjective   Patient ID: Paty Guthrie is a 69 y.o. female who presents for Follow-up (6 month follow up, getting exhausted very quickly, had 1 episode of heart pounding fast for about 5 mins(1 month ago)).    HPI   routine follow up. chronic issues as per assessment and plan.     States she had had more fatigue. Really notices it when she does too much outside. She weeds and mows the grass and notices this tires her out much more quickly than it used to. She also states about a month ago, she got very tired after doing some work outside and she came in and her rate was racing for about 5 minutes.     Reviewed 5/13/25 labs ->. Chol 198; HDL 78; ; TG 86;  CV risk 7.4%; H/H high at 16.2/ 48.5; iron studies normal.     OARRS:  Dagmar Robin MD on 5/29/2025  1:39 PM  I have personally reviewed the OARRS report for Paty Guthrie. I have considered the risks of abuse, dependence, addiction and diversion and I believe that it is clinically appropriate for Paty Guthrie to be prescribed this medication    Is the patient prescribed a combination of a benzodiazepine and opioid?  No    Last Urine Drug Screen / ordered today: No  Recent Results (from the past 8760 hours)   Confirmation Opiate/Opioid/Benzo Prescription Compliance    Collection Time: 12/03/24  1:34 PM   Result Value Ref Range    Clonazepam <25 <25 ng/mL    7-Aminoclonazepam <25 <25 ng/mL    Alprazolam <25 <25 ng/mL    Alpha-Hydroxyalprazolam <25 <25 ng/mL    Midazolam <25 <25 ng/mL    Alpha-Hydroxymidazolam <25 <25 ng/mL    Chlordiazepoxide <25 <25 ng/mL    Diazepam <25 <25 ng/mL    Nordiazepam <25 <25 ng/mL    Temazepam <25 <25 ng/mL    Oxazepam <25 <25 ng/mL    Lorazepam 259 (H) <25 ng/mL    Methadone <25 <25 ng/mL    EDDP <25 <25 ng/mL    6-Acetylmorphine <25 <25 ng/mL    Codeine <50 <50 ng/mL    Hydrocodone <25 <25 ng/mL    Hydromorphone <25 <25 ng/mL    Morphine  <50 <50 ng/mL    Norhydrocodone <25 <25 ng/mL    Noroxycodone <25 <25 ng/mL     Oxycodone <25 <25 ng/mL    Oxymorphone <25 <25 ng/mL    Fentanyl <2.5 <2.5 ng/mL    Norfentanyl <2.5 <2.5 ng/mL    Tramadol <50 <50 ng/mL    O-Desmethyltramadol <50 <50 ng/mL    Zolpidem <25 <25 ng/mL    Zolpidem Metabolite (ZCA) <25 <25 ng/mL   Screen Opiate/Opioid/Benzo Prescription Compliance    Collection Time: 24  1:34 PM   Result Value Ref Range    Creatinine, Urine Random 11.5 (L) 20.0 - 320.0 mg/dL    Amphetamine Screen, Urine Presumptive Negative Presumptive Negative    Barbiturate Screen, Urine Presumptive Negative Presumptive Negative    Cannabinoid Screen, Urine Presumptive Negative Presumptive Negative    Cocaine Metabolite Screen, Urine Presumptive Negative Presumptive Negative    PCP Screen, Urine Presumptive Negative Presumptive Negative     Results are as expected.         Controlled Substance Agreement: Benzodiazpine  Date of the Last Agreement: 2024  Reviewed Controlled Substance Agreement including but not limited to the benefits, risks, and alternatives to treatment with a Controlled Substance medication(s).    Benzodiazepines:  What is the patient's goal of therapy? Decrease anxiety   Is this being achieved with current treatment? Yes     OSVALDO-7:  Over the last 2 weeks, how often have you been bothered by any of the following problems?  Feeling nervous, anxious, or on edge: 2  Not being able to stop or control worryin  Worrying too much about different things: 1  Trouble relaxin  Being so restless that it is hard to sit still: 1  Becoming easily annoyed or irritable: 0  Feeling afraid as if something awful might happen: 0  OSVALDO-7 Total Score: 8        Activities of Daily Living:   Is your overall impression that this patient is benefiting (symptom reduction outweighs side effects) from benzodiazepine therapy? Yes     1. Physical Functioning: Better  2. Family Relationship: Better  3. Social Relationship: Better  4. Mood: Better  5. Sleep Patterns: Better  6. Overall  Function: Better    Orders Only on 05/13/2025   Component Date Value Ref Range Status    CHOLESTEROL, TOTAL 05/13/2025 198  <200 mg/dL Final    HDL CHOLESTEROL 05/13/2025 78  > OR = 50 mg/dL Final    TRIGLYCERIDES 05/13/2025 86  <150 mg/dL Final    LDL-CHOLESTEROL 05/13/2025 102 (H)  mg/dL (calc) Final    Comment: Reference range: <100     Desirable range <100 mg/dL for primary prevention;    <70 mg/dL for patients with CHD or diabetic patients   with > or = 2 CHD risk factors.     LDL-C is now calculated using the Carson   calculation, which is a validated novel method providing   better accuracy than the Friedewald equation in the   estimation of LDL-C.   Raul SS et al. PETER. 2013;310(19): 4117-0925   (http://[email or web address]/GQE512)      CHOL/HDLC RATIO 05/13/2025 2.5  <5.0 (calc) Final    NON HDL CHOLESTEROL 05/13/2025 120  <130 mg/dL (calc) Final    Comment: For patients with diabetes plus 1 major ASCVD risk   factor, treating to a non-HDL-C goal of <100 mg/dL   (LDL-C of <70 mg/dL) is considered a therapeutic   option.      GLUCOSE 05/13/2025 88  65 - 99 mg/dL Final    Comment:               Fasting reference interval         UREA NITROGEN (BUN) 05/13/2025 7  7 - 25 mg/dL Final    CREATININE 05/13/2025 0.85  0.50 - 1.05 mg/dL Final    EGFR 05/13/2025 75  > OR = 60 mL/min/1.73m2 Final    SODIUM 05/13/2025 136  135 - 146 mmol/L Final    POTASSIUM 05/13/2025 4.9  3.5 - 5.3 mmol/L Final    CHLORIDE 05/13/2025 102  98 - 110 mmol/L Final    CARBON DIOXIDE 05/13/2025 22  20 - 32 mmol/L Final    ELECTROLYTE BALANCE 05/13/2025 12  7 - 17 mmol/L (calc) Final    CALCIUM 05/13/2025 9.5  8.6 - 10.4 mg/dL Final    PROTEIN, TOTAL 05/13/2025 6.7  6.1 - 8.1 g/dL Final    ALBUMIN 05/13/2025 4.2  3.6 - 5.1 g/dL Final    BILIRUBIN, TOTAL 05/13/2025 0.7  0.2 - 1.2 mg/dL Final    ALKALINE PHOSPHATASE 05/13/2025 49  37 - 153 U/L Final    AST 05/13/2025 29  10 - 35 U/L Final    ALT 05/13/2025 17  6 - 29  U/L Final    WHITE BLOOD CELL COUNT 05/13/2025 4.4  3.8 - 10.8 Thousand/uL Final    RED BLOOD CELL COUNT 05/13/2025 4.93  3.80 - 5.10 Million/uL Final    HEMOGLOBIN 05/13/2025 16.2 (H)  11.7 - 15.5 g/dL Final    HEMATOCRIT 05/13/2025 48.5 (H)  35.0 - 45.0 % Final    MCV 05/13/2025 98.4  80.0 - 100.0 fL Final    MCH 05/13/2025 32.9  27.0 - 33.0 pg Final    MCHC 05/13/2025 33.4  32.0 - 36.0 g/dL Final    Comment: For adults, a slight decrease in the calculated MCHC  value (in the range of 30 to 32 g/dL) is most likely  not clinically significant; however, it should be  interpreted with caution in correlation with other  red cell parameters and the patient's clinical  condition.      RDW 05/13/2025 12.5  11.0 - 15.0 % Final    PLATELET COUNT 05/13/2025 220  140 - 400 Thousand/uL Final    MPV 05/13/2025 10.2  7.5 - 12.5 fL Final    ABSOLUTE NEUTROPHILS 05/13/2025 2,191  1,500 - 7,800 cells/uL Final    ABSOLUTE LYMPHOCYTES 05/13/2025 1,399  850 - 3,900 cells/uL Final    ABSOLUTE MONOCYTES 05/13/2025 409  200 - 950 cells/uL Final    ABSOLUTE EOSINOPHILS 05/13/2025 339  15 - 500 cells/uL Final    ABSOLUTE BASOPHILS 05/13/2025 62  0 - 200 cells/uL Final    NEUTROPHILS 05/13/2025 49.8  % Final    LYMPHOCYTES 05/13/2025 31.8  % Final    MONOCYTES 05/13/2025 9.3  % Final    EOSINOPHILS 05/13/2025 7.7  % Final    BASOPHILS 05/13/2025 1.4  % Final    VITAMIN B12 05/13/2025 615  200 - 1,100 pg/mL Final    TSH W/REFLEX TO FT4 05/13/2025 3.01  0.40 - 4.50 mIU/L Final    IRON, TOTAL 05/13/2025 133  45 - 160 mcg/dL Final    IRON BINDING CAPACITY 05/13/2025 360  250 - 450 mcg/dL (calc) Final    % SATURATION 05/13/2025 37  16 - 45 % (calc) Final    FERRITIN 05/13/2025 TNP  ng/mL Final    Comment: TEST NOT PERFORMED        Previous testing and related  handling prevents us from  utilizing this sample for the  additional test(s) requested.          Review of Systems   Constitutional:  Positive for fatigue. Negative for chills,  "diaphoresis, fever and unexpected weight change.   HENT:  Negative for congestion, sinus pressure, sinus pain, sneezing and sore throat.    Respiratory:  Negative for cough, chest tightness, shortness of breath and wheezing.    Cardiovascular:  Positive for palpitations. Negative for chest pain and leg swelling.   Gastrointestinal:  Negative for abdominal pain, constipation, diarrhea, nausea and vomiting.   Endocrine: Negative for cold intolerance, heat intolerance, polydipsia, polyphagia and polyuria.   Genitourinary:  Negative for dysuria, frequency, hematuria and urgency.   Neurological:  Negative for dizziness, syncope, light-headedness, numbness and headaches.   Hematological:  Negative for adenopathy.   Psychiatric/Behavioral:  Negative for confusion and dysphoric mood. The patient is not nervous/anxious.        Objective   /80 (BP Location: Right arm, Patient Position: Sitting, BP Cuff Size: Large adult)   Pulse 80   Ht 1.549 m (5' 1\")   Wt 64.4 kg (142 lb)   SpO2 95%   BMI 26.83 kg/m²     Physical Exam  Vitals and nursing note reviewed.   Constitutional:       General: She is not in acute distress.     Appearance: Normal appearance.   HENT:      Head: Normocephalic and atraumatic.      Nose: Nose normal.   Eyes:      Extraocular Movements: Extraocular movements intact.      Conjunctiva/sclera: Conjunctivae normal.      Pupils: Pupils are equal, round, and reactive to light.   Cardiovascular:      Rate and Rhythm: Normal rate and regular rhythm.      Heart sounds: No murmur heard.     No friction rub. No gallop.   Pulmonary:      Effort: Pulmonary effort is normal.      Breath sounds: Normal breath sounds. No wheezing, rhonchi or rales.   Abdominal:      General: Bowel sounds are normal. There is no distension.      Palpations: Abdomen is soft.      Tenderness: There is no abdominal tenderness.   Musculoskeletal:         General: Normal range of motion.      Cervical back: Normal range of motion " and neck supple.   Skin:     General: Skin is warm and dry.   Neurological:      General: No focal deficit present.      Mental Status: She is alert and oriented to person, place, and time.      Deep Tendon Reflexes: Reflexes normal.   Psychiatric:         Mood and Affect: Mood normal.         Behavior: Behavior normal.         Thought Content: Thought content normal.         Judgment: Judgment normal.         Assessment/Plan   Problem List Items Addressed This Visit           ICD-10-CM    Allergic rhinitis due to house dust mite J30.89    - controlled. continue fexofenadine            Relevant Medications    fexofenadine (Allegra) 180 mg tablet    Alejo's esophagus with dysplasia K22.719    - follows with GI           BMI 26.0-26.9,adult Z68.26    - Encouraged healthy lifestyle, including adequate exercise and high fiber, low fat and low carb diet.              Elevated hematocrit - Primary R71.8    - H/H 16.2/ 48.5  - normal iron studies  - recheck CBC         Relevant Orders    CBC and Auto Differential    Ferritin    Iron and TIBC    Elevated hemoglobin D58.2    - H/H 16.2/ 48.5  - normal iron studies  - recheck CBC         Relevant Orders    CBC and Auto Differential    Ferritin    Iron and TIBC    Fatigue due to excessive exertion T73.3XXA    - EKG today shows prolonged QT interval  - refer to cardiology          Relevant Orders    Referral to Cardiology    Gastroesophageal reflux disease with esophagitis without hemorrhage K21.00    - controlled. Continue lansoprazole            Hives L50.9    - controlled. continue fexofenadine         Relevant Medications    fexofenadine (Allegra) 180 mg tablet    Irritable bowel syndrome with diarrhea K58.0    - diet controlled.             Leukopenia D72.819    - saw hematology. [place] to be benign. monitor             Mixed hyperlipidemia E78.2    - 10 year CV risk 7.1%  - check CT cardiac score to help stratify risk   - Encouraged healthy lifestyle, including adequate  "exercise and high fiber, low fat and low carb diet.     Lab Results   Component Value Date    CHOL 198 05/13/2025    CHOL 191 05/16/2024    CHOL 195 11/16/2022     Lab Results   Component Value Date    HDL 78 05/13/2025    HDL 78.4 05/16/2024    HDL 75.1 11/16/2022     Lab Results   Component Value Date    LDLCALC 102 (H) 05/13/2025    LDLCALC 97 05/16/2024     Lab Results   Component Value Date    TRIG 86 05/13/2025    TRIG 80 05/16/2024    TRIG 83 11/16/2022     No components found for: \"CHOLHDL\"          Osteopenia of multiple sites M85.89    - continue calcium plus D  - continue weight bearing exercises  - repeat DEXA 5/2026             Overweight with body mass index (BMI) of 26 to 26.9 in adult E66.3, Z68.26    - Encouraged healthy lifestyle, including adequate exercise and high fiber, low fat and low carb diet.              Palpitations R00.2    - EKG today shows prolonged QT interval  - refer to cardiology          Relevant Orders    ECG 12 Lead (Completed)    Referral to Cardiology    Prolonged Q-T interval on ECG R94.31    - EKG today shows prolonged QT interval  - refer to cardiology          Relevant Orders    Referral to Cardiology    Psychophysiological insomnia F51.04    - stable. continue lorazepam. Has tried many other options and has not been successful             Relevant Medications    LORazepam (Ativan) 2 mg tablet    Seasonal allergic rhinitis due to pollen J30.1    - controlled. Continue fexofenadine              Relevant Medications    fexofenadine (Allegra) 180 mg tablet     Other Visit Diagnoses         Codes      Screening for ischemic heart disease     Z13.6    Relevant Orders    CT cardiac scoring wo IV contrast               "

## 2025-05-29 NOTE — ASSESSMENT & PLAN NOTE
- Encouraged healthy lifestyle, including adequate exercise and high fiber, low fat and low carb diet.

## 2025-05-29 NOTE — ASSESSMENT & PLAN NOTE
"- 10 year CV risk 7.1%  - check CT cardiac score to help stratify risk   - Encouraged healthy lifestyle, including adequate exercise and high fiber, low fat and low carb diet.     Lab Results   Component Value Date    CHOL 198 05/13/2025    CHOL 191 05/16/2024    CHOL 195 11/16/2022     Lab Results   Component Value Date    HDL 78 05/13/2025    HDL 78.4 05/16/2024    HDL 75.1 11/16/2022     Lab Results   Component Value Date    LDLCALC 102 (H) 05/13/2025    LDLCALC 97 05/16/2024     Lab Results   Component Value Date    TRIG 86 05/13/2025    TRIG 80 05/16/2024    TRIG 83 11/16/2022     No components found for: \"CHOLHDL\"   "

## 2025-05-29 NOTE — PATIENT INSTRUCTIONS
Paty Guthrie ,    Thank you for coming in today. We at [facility] appreciate your trust in our care. If you have any questions or concerns about the care you received today, please do not hesitate to contact us at [phone].    The following instructions were discussed today:    - get repeat blood work to check blood count   - get CT scan of heart   - refer to cardiology   - Follow up in 4 months.

## 2025-06-02 ENCOUNTER — TELEPHONE (OUTPATIENT)
Dept: PRIMARY CARE | Facility: CLINIC | Age: 70
End: 2025-06-02
Payer: MEDICARE

## 2025-06-03 ENCOUNTER — APPOINTMENT (OUTPATIENT)
Dept: PRIMARY CARE | Facility: CLINIC | Age: 70
End: 2025-06-03
Payer: MEDICARE

## 2025-06-03 ENCOUNTER — TELEPHONE (OUTPATIENT)
Dept: PRIMARY CARE | Facility: CLINIC | Age: 70
End: 2025-06-03

## 2025-06-03 LAB
BASOPHILS # BLD AUTO: 59 CELLS/UL (ref 0–200)
BASOPHILS NFR BLD AUTO: 1.2 %
EOSINOPHIL # BLD AUTO: 260 CELLS/UL (ref 15–500)
EOSINOPHIL NFR BLD AUTO: 5.3 %
ERYTHROCYTE [DISTWIDTH] IN BLOOD BY AUTOMATED COUNT: 11.9 % (ref 11–15)
FERRITIN SERPL-MCNC: 54 NG/ML (ref 16–288)
HCT VFR BLD AUTO: 42.6 % (ref 35–45)
HGB BLD-MCNC: 14.3 G/DL (ref 11.7–15.5)
IRON SATN MFR SERPL: 25 % (CALC) (ref 16–45)
IRON SERPL-MCNC: 71 MCG/DL (ref 45–160)
LYMPHOCYTES # BLD AUTO: 1352 CELLS/UL (ref 850–3900)
LYMPHOCYTES NFR BLD AUTO: 27.6 %
MCH RBC QN AUTO: 32.4 PG (ref 27–33)
MCHC RBC AUTO-ENTMCNC: 33.6 G/DL (ref 32–36)
MCV RBC AUTO: 96.4 FL (ref 80–100)
MONOCYTES # BLD AUTO: 358 CELLS/UL (ref 200–950)
MONOCYTES NFR BLD AUTO: 7.3 %
NEUTROPHILS # BLD AUTO: 2871 CELLS/UL (ref 1500–7800)
NEUTROPHILS NFR BLD AUTO: 58.6 %
PLATELET # BLD AUTO: 223 THOUSAND/UL (ref 140–400)
PMV BLD REES-ECKER: 10.1 FL (ref 7.5–12.5)
RBC # BLD AUTO: 4.42 MILLION/UL (ref 3.8–5.1)
TIBC SERPL-MCNC: 287 MCG/DL (CALC) (ref 250–450)
WBC # BLD AUTO: 4.9 THOUSAND/UL (ref 3.8–10.8)

## 2025-06-06 ENCOUNTER — TELEPHONE (OUTPATIENT)
Facility: CLINIC | Age: 70
End: 2025-06-06
Payer: MEDICARE

## 2025-06-06 DIAGNOSIS — Z12.11 ENCOUNTER FOR SCREENING COLONOSCOPY: Primary | ICD-10-CM

## 2025-06-06 NOTE — TELEPHONE ENCOUNTER
Per chart recall, pt due for repeat colonoscopy  - can you place the order for the screening colonoscopy and send to [facility] .  Thank you.    *Chart reviewed for age, BMI and anticoagulants.     Last colon 5/16/23- [place].  Recheck in 3 years.

## 2025-06-10 ENCOUNTER — TELEPHONE (OUTPATIENT)
Facility: CLINIC | Age: 70
End: 2025-06-10
Payer: MEDICARE

## 2025-06-10 NOTE — TELEPHONE ENCOUNTER
Called pt to schedule office visit and she stated she wanted to wait until next year when she is due to colonoscopy and do both procedures at the same time

## 2025-06-10 NOTE — TELEPHONE ENCOUNTER
----- Message from Wes GLEASON sent at 6/9/2025  4:37 PM EDT -----  Regarding: FW: Provation recalls    ----- Message -----  From: Pippa Hamilton RN  Sent: 5/28/2025   4:46 PM EDT  To: Wes Figueroa MA  Subject: Provation recalls                                Robert Harvey,    I am going through the Provation recalls, and wondering if you would like me to send over the patients who need to be scheduled for their EGDs? Of course, this would only be for the patients who I do not see an order scheduled, or an see them on the Appointment desk for an office visit.     Pippa Guthrie, Mikal Delgadillo [id number] Repeat UGI endoscopy [id number] (Home) 5/3/2022 5/3/2025  Comments: - Repeat upper endoscopy in 3 years for surveillance.  Indications:  Findings:  Impression:  Follow-up of Alejo's esophagus  Esophagus --- Alejo's esophagus  Duodenum --- Normal  Stomach --- Polyp(s)  - Esophageal mucosal changes consistent with long-segment Alejo's esophagus.  Biopsied.  - Multiple gastric polyps.  - Normal examined duodenum.  Interaction Type Result Date Comments  8/13/2024 1:16:38 PM  Patients Due for Recall From 1/1/2025 to 12/31/2025  Notes included: Finalized, Addendum, Supervisor Override  Physicians : All  [facility]  [facility]  Page [phone] is [phone]

## 2025-06-11 ENCOUNTER — ANCILLARY PROCEDURE (OUTPATIENT)
Dept: CARDIOLOGY | Facility: HOSPITAL | Age: 70
End: 2025-06-11
Payer: MEDICARE

## 2025-06-11 ENCOUNTER — OFFICE VISIT (OUTPATIENT)
Dept: CARDIOLOGY | Facility: HOSPITAL | Age: 70
End: 2025-06-11
Payer: MEDICARE

## 2025-06-11 VITALS
DIASTOLIC BLOOD PRESSURE: 80 MMHG | HEART RATE: 91 BPM | WEIGHT: 142 LBS | BODY MASS INDEX: 26.81 KG/M2 | SYSTOLIC BLOOD PRESSURE: 142 MMHG | OXYGEN SATURATION: 95 % | HEIGHT: 61 IN

## 2025-06-11 DIAGNOSIS — R94.31 ABNORMAL ELECTROCARDIOGRAM (ECG) (EKG): Primary | ICD-10-CM

## 2025-06-11 DIAGNOSIS — T73.3XXA FATIGUE DUE TO EXCESSIVE EXERTION, INITIAL ENCOUNTER: ICD-10-CM

## 2025-06-11 DIAGNOSIS — R00.2 PALPITATIONS: ICD-10-CM

## 2025-06-11 DIAGNOSIS — R94.31 PROLONGED Q-T INTERVAL ON ECG: ICD-10-CM

## 2025-06-11 LAB
ATRIAL RATE: 76 BPM
BODY SURFACE AREA: 1.66 M2
P AXIS: 43 DEGREES
P OFFSET: 196 MS
P ONSET: 152 MS
PR INTERVAL: 146 MS
Q ONSET: 225 MS
QRS COUNT: 13 BEATS
QRS DURATION: 94 MS
QT INTERVAL: 386 MS
QTC CALCULATION(BAZETT): 434 MS
QTC FREDERICIA: 417 MS
R AXIS: 14 DEGREES
T AXIS: 29 DEGREES
T OFFSET: 418 MS
VENTRICULAR RATE: 76 BPM

## 2025-06-11 PROCEDURE — 93225 XTRNL ECG REC<48 HRS REC: CPT

## 2025-06-11 PROCEDURE — 3008F BODY MASS INDEX DOCD: CPT | Performed by: STUDENT IN AN ORGANIZED HEALTH CARE EDUCATION/TRAINING PROGRAM

## 2025-06-11 PROCEDURE — 99213 OFFICE O/P EST LOW 20 MIN: CPT | Mod: 25 | Performed by: STUDENT IN AN ORGANIZED HEALTH CARE EDUCATION/TRAINING PROGRAM

## 2025-06-11 PROCEDURE — 99205 OFFICE O/P NEW HI 60 MIN: CPT | Performed by: STUDENT IN AN ORGANIZED HEALTH CARE EDUCATION/TRAINING PROGRAM

## 2025-06-11 PROCEDURE — 93010 ELECTROCARDIOGRAM REPORT: CPT | Performed by: STUDENT IN AN ORGANIZED HEALTH CARE EDUCATION/TRAINING PROGRAM

## 2025-06-11 PROCEDURE — 1123F ACP DISCUSS/DSCN MKR DOCD: CPT | Performed by: STUDENT IN AN ORGANIZED HEALTH CARE EDUCATION/TRAINING PROGRAM

## 2025-06-11 PROCEDURE — 1159F MED LIST DOCD IN RCRD: CPT | Performed by: STUDENT IN AN ORGANIZED HEALTH CARE EDUCATION/TRAINING PROGRAM

## 2025-06-11 PROCEDURE — 93005 ELECTROCARDIOGRAM TRACING: CPT | Performed by: STUDENT IN AN ORGANIZED HEALTH CARE EDUCATION/TRAINING PROGRAM

## 2025-06-11 NOTE — PROGRESS NOTES
Tucker PHILLIPS placed holter monitor on pt on 06/11/25     Pt was explained on what they can and cannot do while wearing the monitor for 48 hours.    Pt verbalized understanding.

## 2025-06-11 NOTE — PROGRESS NOTES
[facility] Clinic Note    Date: 06/12/25  Time: 10:37 AM    Subjective   Paty Guthrie is a 69 y.o. female who is coming to cardiology for establishing care.  Patient with past medical history of hyperlipidemia patient has a history of palpitation and fatigue.  Patient had EKG at PCP office which was concerning for abnormal QTc and cardiology consultation was requested.  Patient reports that she did EKG because he was noticing some palpitation and fatigue.    Patient denies any chest pain or syncope.    EKG here shows normal QTc.  No prior EKG in our system    Most recent labs showed normal renal function and TSH.    LDL slightly elevated.    Normal hemoglobin and hematocrit.    Her blood pressure has been elevated on past 3 visit.  She reports that at home blood pressures are well-controlled.        Review of Systems:  Otherwise, limited cardiovascular review of systems is negative.        Medical History:   She has a past medical history of Arthritis, Breast cancer, Cancer (Multi), Constipation (12/18/2023), Eating disorder, antineoplastic chemo, Other conditions influencing health status (03/03/2020), Personal history of irradiation, Personal history of malignant neoplasm of breast, Personal history of other diseases of the digestive system (06/06/2018), Personal history of other malignant neoplasm of skin, and Scoliosis.  Surgical History:   Surgical History[1]PSHP@  Social History:   Social Drivers of Health with Concerns     Tobacco Use: Medium Risk (6/11/2025)    Patient History     Smoking Tobacco Use: Former     Smokeless Tobacco Use: Never     Passive Exposure: Not on file   Alcohol Use: Not on file   Financial Resource Strain: Not on file   Food Insecurity: Not on file   Transportation Needs: Not on file   Physical Activity: Not on file   Stress: Not on file   Social Connections: Not on file   Intimate Partner Violence: Not on file   Housing Stability: Not on file  "  Utilities: Not on file   Digital Equity: Not on file   Health Literacy: Not on file     Family History:   Family History[2]   Allergies:  Adhesive tape-silicones    Outpatient Medications:  Current Outpatient Medications   Medication Instructions    calcium carbonate-vitamin D3 (Caltrate with Vitamin D3) 600 mg-20 mcg (800 unit) tablet 1 tablet, 2 times daily    fexofenadine (ALLEGRA) 180 mg, oral, Daily RT    lansoprazole (PREVACID) 30 mg, oral, Daily before breakfast, Do not crush or chew.    LORazepam (ATIVAN) 2 mg, oral, Nightly    multivitamin tablet 1 tablet, Daily       Objective     Physical Exam  Vitals:    06/11/25 1252   BP: 142/80   BP Location: Right arm   Patient Position: Sitting   BP Cuff Size: Adult   Pulse: 91   SpO2: 95%   Weight: 64.4 kg (142 lb)   Height: 1.549 m (5' 1\")     Wt Readings from Last 3 Encounters:   06/11/25 64.4 kg (142 lb)   05/29/25 64.4 kg (142 lb)   12/03/24 63.5 kg (140 lb)       General: Alert and Oriented, No distress, cooperative  Head: Normocephalic without obvious abnormality, atraumatic  Eyes: Conjunctiva/corneas clear, EOM's grossly intact  Neck: Supple, trachea midline, No thyroid enlargement/tenderness/nodules; No JVD  Lungs: Clear to auscultation bilaterally, no wheezes, rhonci, or rales. respirations unlabored  Chest Wall: No tenderness or deformity  Heart: Regular rhythm, normal S1/S2, no murmur  Abdomen: Soft, non-tender, Non-distended, bowel sounds active  Extremities: No edema, no cyanosis, no clubbing  Skin: Skin color, texture, turgor normal.  No rashes or lesions noted  Neurologic: Alert and oriented x 3, grossly moving all extremities, speech intact        I have personally reviewed the following images and laboratory findings:  ECG: Scanned  Echocardiogram: not done  Laboratory values:   Ancillary Procedure on 06/11/2025   Component Date Value    BSA 06/11/2025 1.66    Office Visit on 05/29/2025   Component Date Value    WHITE BLOOD CELL COUNT 06/02/2025 " 4.9     RED BLOOD CELL COUNT 06/02/2025 4.42     HEMOGLOBIN 06/02/2025 14.3     HEMATOCRIT 06/02/2025 42.6     MCV 06/02/2025 96.4     MCH 06/02/2025 32.4     MCHC 06/02/2025 33.6     RDW 06/02/2025 11.9     PLATELET COUNT 06/02/2025 223     MPV 06/02/2025 10.1     ABSOLUTE NEUTROPHILS 06/02/2025 2,871     ABSOLUTE LYMPHOCYTES 06/02/2025 1,352     ABSOLUTE MONOCYTES 06/02/2025 358     ABSOLUTE EOSINOPHILS 06/02/2025 260     ABSOLUTE BASOPHILS 06/02/2025 59     NEUTROPHILS 06/02/2025 58.6     LYMPHOCYTES 06/02/2025 27.6     MONOCYTES 06/02/2025 7.3     EOSINOPHILS 06/02/2025 5.3     BASOPHILS 06/02/2025 1.2     FERRITIN 06/02/2025 54     IRON, TOTAL 06/02/2025 71     IRON BINDING CAPACITY 06/02/2025 287     % SATURATION 06/02/2025 25    Orders Only on 05/13/2025   Component Date Value    CHOLESTEROL, TOTAL 05/13/2025 198     HDL CHOLESTEROL 05/13/2025 78     TRIGLYCERIDES 05/13/2025 86     LDL-CHOLESTEROL 05/13/2025 102 (H)     CHOL/HDLC RATIO 05/13/2025 2.5     NON HDL CHOLESTEROL 05/13/2025 120     GLUCOSE 05/13/2025 88     UREA NITROGEN (BUN) 05/13/2025 7     CREATININE 05/13/2025 0.85     EGFR 05/13/2025 75     SODIUM 05/13/2025 136     POTASSIUM 05/13/2025 4.9     CHLORIDE 05/13/2025 102     CARBON DIOXIDE 05/13/2025 22     ELECTROLYTE BALANCE 05/13/2025 12     CALCIUM 05/13/2025 9.5     PROTEIN, TOTAL 05/13/2025 6.7     ALBUMIN 05/13/2025 4.2     BILIRUBIN, TOTAL 05/13/2025 0.7     ALKALINE PHOSPHATASE 05/13/2025 49     AST 05/13/2025 29     ALT 05/13/2025 17     WHITE BLOOD CELL COUNT 05/13/2025 4.4     RED BLOOD CELL COUNT 05/13/2025 4.93     HEMOGLOBIN 05/13/2025 16.2 (H)     HEMATOCRIT 05/13/2025 48.5 (H)     MCV 05/13/2025 98.4     MCH 05/13/2025 32.9     MCHC 05/13/2025 33.4     RDW 05/13/2025 12.5     PLATELET COUNT 05/13/2025 220     MPV 05/13/2025 10.2     ABSOLUTE NEUTROPHILS 05/13/2025 2,191     ABSOLUTE LYMPHOCYTES 05/13/2025 1,399     ABSOLUTE MONOCYTES 05/13/2025 409     ABSOLUTE EOSINOPHILS  05/13/2025 339     ABSOLUTE BASOPHILS 05/13/2025 62     NEUTROPHILS 05/13/2025 49.8     LYMPHOCYTES 05/13/2025 31.8     MONOCYTES 05/13/2025 9.3     EOSINOPHILS 05/13/2025 7.7     BASOPHILS 05/13/2025 1.4     VITAMIN B12 05/13/2025 615     TSH W/REFLEX TO FT4 05/13/2025 3.01     IRON, TOTAL 05/13/2025 133     IRON BINDING CAPACITY 05/13/2025 360     % SATURATION 05/13/2025 37     FERRITIN 05/13/2025 TNP      CBC -  Lab Results   Component Value Date    WBC 4.9 06/02/2025    HGB 14.3 06/02/2025    HCT 42.6 06/02/2025    MCV 96.4 06/02/2025     06/02/2025       CMP -  Lab Results   Component Value Date    CALCIUM 9.5 05/13/2025    PROT 6.7 05/13/2025    ALBUMIN 4.2 05/13/2025    AST 29 05/13/2025    ALT 17 05/13/2025    ALKPHOS 49 05/13/2025    BILITOT 0.7 05/13/2025       LIPID PANEL -   Lab Results   Component Value Date    CHOL 198 05/13/2025    HDL 78 05/13/2025    CHHDL 2.5 05/13/2025    VLDL 16 05/16/2024    TRIG 86 05/13/2025    NHDL 120 05/13/2025       RENAL FUNCTION PANEL -   Lab Results   Component Value Date    K 4.9 05/13/2025       Lab Results   Component Value Date    HGBA1C 5.6 06/18/2021        Assessment/Plan   Abnormal EKG  Palpitation  Elevated blood pressure without diagnosis of hypertension    Plan:  -I reviewed the EKG.  QTc within normal limits.  Patient has possible inferior infarction could be lead placement.  Patient denies any chest pain, SOB or syncope.  -She endorses intermittent palpitations.  I will obtain Holter for characterization of any malignant arrhythmia.  -Check TTE given possibility of prior infarction.  - Patient to check blood pressure at home and come for nurse visit in 1 month.    RTC after testing      In addition, the following orders were placed today:  Orders Placed This Encounter   Procedures    Holter Or Event Cardiac Monitor    ECG 12 Lead    Transthoracic Echo (TTE) Complete                 SIGNATURE: Ilan Otero MD PATIENT NAME: Paty Guthrie    DATE/TIME: June 12, 2025 10:37 AM MRN: [id number]                                  [1]   Past Surgical History:  Procedure Laterality Date    BACK SURGERY  06/06/2018    Back Surgery    BREAST BIOPSY  06/06/2018    Biopsy Breast Open    BREAST LUMPECTOMY  06/06/2018    Breast Surgery Lumpectomy    BREAST SURGERY  06/06/2018    Breast Surgery Reduction Procedure    CHOLECYSTECTOMY  06/05/2018    Cholecystectomy    HYSTERECTOMY  06/05/2018    Hysterectomy    MASTECTOMY, PARTIAL  06/05/2018    Breast Surgery Partial Mastectomy    OOPHORECTOMY  06/05/2018    Oophorectomy    OTHER SURGICAL HISTORY  06/05/2018    Breast Surgery Reconstruction    OTHER SURGICAL HISTORY  06/06/2018    Marsupialization Of Bartholin's Gland Cyst    OTHER SURGICAL HISTORY  06/06/2018    History Of Prior Surgery    TUBAL LIGATION  06/05/2018    Tubal Ligation   [2]   Family History  Problem Relation Name Age of Onset    Asthma Mother Angiesiobhan Fernándezbushra     Miscarriages / Stillbirths Mother Angie Fernándezjakejackson     Heart disease Father Naveed Marx     Colon cancer Maternal Grandmother Natalee Carrero     COPD Sister Paul Araseli     Heart disease Brother Gildardo Araseli

## 2025-06-20 ENCOUNTER — TELEPHONE (OUTPATIENT)
Dept: CARDIOLOGY | Facility: HOSPITAL | Age: 70
End: 2025-06-20

## 2025-06-20 LAB — BODY SURFACE AREA: 1.66 M2

## 2025-06-20 PROCEDURE — 93227 XTRNL ECG REC<48 HR R&I: CPT | Performed by: INTERNAL MEDICINE

## 2025-06-20 NOTE — TELEPHONE ENCOUNTER
Pt called with BP readings to relay to Dr. Otero:    6/11 132/79  - HR 81    6/12 /69 - HR 81  /69 - HR 75    6/13 /67   /72 - HR 73    6/14 /67 - HR 79  /67 - HR 69    6/15 /66 - HR 78  /70 - HR 69    6/16 /72 - HR 80  /81 - HR 70    6/17 /77 - HR 85  /74 - HR 77    6/18 /81 - HR 93  /81 - HR 92    6/19 /74 - HR 96  /83 - HR 92    6/20 /79 -

## 2025-06-20 NOTE — TELEPHONE ENCOUNTER
6/20/25  1118  Called Holter monitor results and follow up directive to patient with patient verbalizing understanding.        ----- Message from Ilan Otero sent at 6/20/2025  7:26 AM EDT -----  Minor, routine   ----- Message -----  From: Kvng Hinton DO  Sent: 6/20/2025   6:00 AM EDT  To: Ilan Otero MD

## 2025-06-25 ENCOUNTER — HOSPITAL ENCOUNTER (OUTPATIENT)
Dept: CARDIOLOGY | Facility: HOSPITAL | Age: 70
Discharge: HOME | End: 2025-06-25
Payer: MEDICARE

## 2025-06-25 DIAGNOSIS — R94.31 ABNORMAL ELECTROCARDIOGRAM (ECG) (EKG): ICD-10-CM

## 2025-06-25 DIAGNOSIS — R00.2 PALPITATIONS: ICD-10-CM

## 2025-06-25 PROCEDURE — 93306 TTE W/DOPPLER COMPLETE: CPT

## 2025-06-25 PROCEDURE — 93306 TTE W/DOPPLER COMPLETE: CPT | Performed by: STUDENT IN AN ORGANIZED HEALTH CARE EDUCATION/TRAINING PROGRAM

## 2025-06-26 LAB
AORTIC VALVE MEAN GRADIENT: 2 MMHG
AORTIC VALVE PEAK VELOCITY: 0.98 M/S
AV PEAK GRADIENT: 4 MMHG
AVA (PEAK VEL): 2.43 CM2
AVA (VTI): 2.21 CM2
EJECTION FRACTION APICAL 4 CHAMBER: 56.3
EJECTION FRACTION: 58 %
LEFT ATRIUM VOLUME AREA LENGTH INDEX BSA: 20.6 ML/M2
LEFT VENTRICLE INTERNAL DIMENSION DIASTOLE: 4.44 CM (ref 3.5–6)
LEFT VENTRICULAR OUTFLOW TRACT DIAMETER: 1.96 CM
LV EJECTION FRACTION BIPLANE: 52 %
MITRAL VALVE E/A RATIO: 0.66
MITRAL VALVE E/E' RATIO: 0
RIGHT VENTRICLE FREE WALL PEAK S': 11.16 CM/S
RIGHT VENTRICLE PEAK SYSTOLIC PRESSURE: 22 MMHG
TRICUSPID ANNULAR PLANE SYSTOLIC EXCURSION: 2.4 CM

## 2025-07-02 ENCOUNTER — TELEPHONE (OUTPATIENT)
Dept: CARDIOLOGY | Facility: HOSPITAL | Age: 70
End: 2025-07-02
Payer: MEDICARE

## 2025-07-02 NOTE — TELEPHONE ENCOUNTER
Patient called and spoke with RN, patient notified RN that she has been taking her blood pressure at home and 2 days ago her SBP was 154. She also reports that in the morning it run one teens to 120's. RN reviewed with patient that 1 high blood pressure 2 days ago is not dangerous and to bring log to upcoming appointment. Patient verbalized understanding and is agreeable to follow up as scheduled.

## 2025-07-11 ENCOUNTER — CLINICAL SUPPORT (OUTPATIENT)
Dept: CARDIOLOGY | Facility: HOSPITAL | Age: 70
End: 2025-07-11
Payer: MEDICARE

## 2025-07-11 VITALS — DIASTOLIC BLOOD PRESSURE: 74 MMHG | OXYGEN SATURATION: 97 % | HEART RATE: 74 BPM | SYSTOLIC BLOOD PRESSURE: 132 MMHG

## 2025-07-11 PROCEDURE — 99211 OFF/OP EST MAY X REQ PHY/QHP: CPT

## 2025-07-11 PROCEDURE — 93786 AMBL BP MNTR W/SW REC ONLY: CPT | Performed by: STUDENT IN AN ORGANIZED HEALTH CARE EDUCATION/TRAINING PROGRAM

## 2025-07-11 NOTE — PROGRESS NOTES
Patient brought back to room 4. Home medications and allergies reviewed with patient. BP log scanned into the chart and blood pressure obtained. /74. Dr. Otero notified, no new orders at this time.

## 2025-07-16 ENCOUNTER — TELEPHONE (OUTPATIENT)
Dept: CARDIOLOGY | Facility: HOSPITAL | Age: 70
End: 2025-07-16
Payer: MEDICARE

## 2025-07-16 NOTE — TELEPHONE ENCOUNTER
Pt LVM today requesting an update regarding next steps after BP visit 7/11 with Jory PRICE. Pt states she has not yet heard if any adjustments will be made regarding results.

## 2025-07-16 NOTE — TELEPHONE ENCOUNTER
RN called and spoke with patient notified of no new changes after BP check. Patient also stated she wanted to cancel December appointment.

## 2025-07-28 DIAGNOSIS — K21.00 GASTROESOPHAGEAL REFLUX DISEASE WITH ESOPHAGITIS WITHOUT HEMORRHAGE: ICD-10-CM

## 2025-07-28 NOTE — TELEPHONE ENCOUNTER
**Can pend to Dr. Robin, patient has a week left    Refill request: Lansoprazole 30mg 1 capsule daily     [facility]

## 2025-07-30 DIAGNOSIS — K21.00 GASTROESOPHAGEAL REFLUX DISEASE WITH ESOPHAGITIS WITHOUT HEMORRHAGE: ICD-10-CM

## 2025-07-30 RX ORDER — LANSOPRAZOLE 30 MG/1
30 CAPSULE, DELAYED RELEASE ORAL
Qty: 90 CAPSULE | Refills: 1 | Status: SHIPPED | OUTPATIENT
Start: 2025-07-30 | End: 2026-01-26

## 2025-07-30 RX ORDER — LANSOPRAZOLE 30 MG/1
CAPSULE, DELAYED RELEASE ORAL
Qty: 90 CAPSULE | Refills: 0 | OUTPATIENT
Start: 2025-07-30

## 2025-08-27 ENCOUNTER — TELEPHONE (OUTPATIENT)
Dept: PRIMARY CARE | Facility: CLINIC | Age: 70
End: 2025-08-27
Payer: MEDICARE

## 2025-08-27 DIAGNOSIS — F51.04 PSYCHOPHYSIOLOGICAL INSOMNIA: ICD-10-CM

## 2025-08-27 RX ORDER — LORAZEPAM 2 MG/1
2 TABLET ORAL NIGHTLY
Qty: 90 TABLET | Refills: 0 | Status: SHIPPED | OUTPATIENT
Start: 2025-08-27 | End: 2025-11-25

## 2025-09-11 ENCOUNTER — APPOINTMENT (OUTPATIENT)
Dept: CARDIOLOGY | Facility: HOSPITAL | Age: 70
End: 2025-09-11
Payer: MEDICARE

## 2025-09-29 ENCOUNTER — APPOINTMENT (OUTPATIENT)
Dept: PRIMARY CARE | Facility: CLINIC | Age: 70
End: 2025-09-29
Payer: MEDICARE

## 2025-11-05 ENCOUNTER — APPOINTMENT (OUTPATIENT)
Dept: RADIOLOGY | Facility: HOSPITAL | Age: 70
End: 2025-11-05
Payer: MEDICARE

## 2025-11-26 ENCOUNTER — APPOINTMENT (OUTPATIENT)
Dept: PRIMARY CARE | Facility: CLINIC | Age: 70
End: 2025-11-26
Payer: MEDICARE

## 2025-12-03 ENCOUNTER — APPOINTMENT (OUTPATIENT)
Dept: PRIMARY CARE | Facility: CLINIC | Age: 70
End: 2025-12-03
Payer: MEDICARE